# Patient Record
Sex: FEMALE | Race: WHITE | NOT HISPANIC OR LATINO | Employment: FULL TIME | URBAN - METROPOLITAN AREA
[De-identification: names, ages, dates, MRNs, and addresses within clinical notes are randomized per-mention and may not be internally consistent; named-entity substitution may affect disease eponyms.]

---

## 2018-08-21 ENCOUNTER — HOSPITAL ENCOUNTER (EMERGENCY)
Facility: CLINIC | Age: 28
Discharge: HOME | End: 2018-08-21
Attending: FAMILY MEDICINE | Admitting: FAMILY MEDICINE
Payer: COMMERCIAL

## 2018-08-21 ENCOUNTER — APPOINTMENT (OUTPATIENT)
Dept: CT IMAGING | Facility: CLINIC | Age: 28
End: 2018-08-21
Attending: FAMILY MEDICINE
Payer: COMMERCIAL

## 2018-08-21 VITALS
HEART RATE: 85 BPM | OXYGEN SATURATION: 99 % | RESPIRATION RATE: 16 BRPM | DIASTOLIC BLOOD PRESSURE: 79 MMHG | SYSTOLIC BLOOD PRESSURE: 135 MMHG | TEMPERATURE: 99.5 F | WEIGHT: 210 LBS

## 2018-08-21 DIAGNOSIS — R10.2 ABDOMINAL PAIN, SUPRAPUBIC: ICD-10-CM

## 2018-08-21 DIAGNOSIS — N83.201 CYST OF RIGHT OVARY: ICD-10-CM

## 2018-08-21 DIAGNOSIS — R01.1 UNDIAGNOSED CARDIAC MURMURS: ICD-10-CM

## 2018-08-21 LAB
ALBUMIN SERPL-MCNC: 3.8 G/DL (ref 3.4–5)
ALBUMIN UR-MCNC: NEGATIVE MG/DL
ALP SERPL-CCNC: 70 U/L (ref 40–150)
ALT SERPL W P-5'-P-CCNC: 22 U/L (ref 0–50)
ANION GAP SERPL CALCULATED.3IONS-SCNC: 5 MMOL/L (ref 3–14)
APPEARANCE UR: CLEAR
AST SERPL W P-5'-P-CCNC: 10 U/L (ref 0–45)
BASOPHILS # BLD AUTO: 0.1 10E9/L (ref 0–0.2)
BASOPHILS NFR BLD AUTO: 0.5 %
BILIRUB SERPL-MCNC: 0.2 MG/DL (ref 0.2–1.3)
BILIRUB UR QL STRIP: NEGATIVE
BUN SERPL-MCNC: 9 MG/DL (ref 7–30)
CALCIUM SERPL-MCNC: 8.9 MG/DL (ref 8.5–10.1)
CHLORIDE SERPL-SCNC: 109 MMOL/L (ref 94–109)
CO2 SERPL-SCNC: 27 MMOL/L (ref 20–32)
COLOR UR AUTO: YELLOW
CREAT SERPL-MCNC: 0.65 MG/DL (ref 0.52–1.04)
DIFFERENTIAL METHOD BLD: NORMAL
EOSINOPHIL # BLD AUTO: 0.1 10E9/L (ref 0–0.7)
EOSINOPHIL NFR BLD AUTO: 1 %
ERYTHROCYTE [DISTWIDTH] IN BLOOD BY AUTOMATED COUNT: 12.3 % (ref 10–15)
GFR SERPL CREATININE-BSD FRML MDRD: >90 ML/MIN/1.7M2
GLUCOSE SERPL-MCNC: 81 MG/DL (ref 70–99)
GLUCOSE UR STRIP-MCNC: NEGATIVE MG/DL
HCG SERPL QL: NEGATIVE
HCT VFR BLD AUTO: 38.7 % (ref 35–47)
HGB BLD-MCNC: 12.6 G/DL (ref 11.7–15.7)
HGB UR QL STRIP: NEGATIVE
IMM GRANULOCYTES # BLD: 0 10E9/L (ref 0–0.4)
IMM GRANULOCYTES NFR BLD: 0.4 %
KETONES UR STRIP-MCNC: NEGATIVE MG/DL
LEUKOCYTE ESTERASE UR QL STRIP: NEGATIVE
LIPASE SERPL-CCNC: 135 U/L (ref 73–393)
LYMPHOCYTES # BLD AUTO: 2 10E9/L (ref 0.8–5.3)
LYMPHOCYTES NFR BLD AUTO: 17.9 %
MCH RBC QN AUTO: 31.4 PG (ref 26.5–33)
MCHC RBC AUTO-ENTMCNC: 32.6 G/DL (ref 31.5–36.5)
MCV RBC AUTO: 97 FL (ref 78–100)
MONOCYTES # BLD AUTO: 0.6 10E9/L (ref 0–1.3)
MONOCYTES NFR BLD AUTO: 5.2 %
NEUTROPHILS # BLD AUTO: 8.2 10E9/L (ref 1.6–8.3)
NEUTROPHILS NFR BLD AUTO: 75 %
NITRATE UR QL: NEGATIVE
NRBC # BLD AUTO: 0 10*3/UL
NRBC BLD AUTO-RTO: 0 /100
PH UR STRIP: 8 PH (ref 5–7)
PLATELET # BLD AUTO: 283 10E9/L (ref 150–450)
POTASSIUM SERPL-SCNC: 3.8 MMOL/L (ref 3.4–5.3)
PROT SERPL-MCNC: 7.5 G/DL (ref 6.8–8.8)
RBC # BLD AUTO: 4.01 10E12/L (ref 3.8–5.2)
RBC #/AREA URNS AUTO: <1 /HPF (ref 0–2)
SODIUM SERPL-SCNC: 141 MMOL/L (ref 133–144)
SOURCE: ABNORMAL
SP GR UR STRIP: 1.01 (ref 1–1.03)
SQUAMOUS #/AREA URNS AUTO: <1 /HPF (ref 0–1)
UROBILINOGEN UR STRIP-MCNC: 0 MG/DL (ref 0–2)
WBC # BLD AUTO: 10.9 10E9/L (ref 4–11)
WBC #/AREA URNS AUTO: <1 /HPF (ref 0–5)

## 2018-08-21 PROCEDURE — 83690 ASSAY OF LIPASE: CPT | Performed by: FAMILY MEDICINE

## 2018-08-21 PROCEDURE — 99284 EMERGENCY DEPT VISIT MOD MDM: CPT | Mod: Z6 | Performed by: FAMILY MEDICINE

## 2018-08-21 PROCEDURE — 99285 EMERGENCY DEPT VISIT HI MDM: CPT | Mod: 25 | Performed by: FAMILY MEDICINE

## 2018-08-21 PROCEDURE — 87086 URINE CULTURE/COLONY COUNT: CPT | Performed by: FAMILY MEDICINE

## 2018-08-21 PROCEDURE — 96374 THER/PROPH/DIAG INJ IV PUSH: CPT | Mod: 59 | Performed by: FAMILY MEDICINE

## 2018-08-21 PROCEDURE — 96361 HYDRATE IV INFUSION ADD-ON: CPT | Performed by: FAMILY MEDICINE

## 2018-08-21 PROCEDURE — 25000125 ZZHC RX 250: Performed by: FAMILY MEDICINE

## 2018-08-21 PROCEDURE — 81001 URINALYSIS AUTO W/SCOPE: CPT | Performed by: FAMILY MEDICINE

## 2018-08-21 PROCEDURE — 25000128 H RX IP 250 OP 636: Performed by: FAMILY MEDICINE

## 2018-08-21 PROCEDURE — 85025 COMPLETE CBC W/AUTO DIFF WBC: CPT | Performed by: FAMILY MEDICINE

## 2018-08-21 PROCEDURE — 74177 CT ABD & PELVIS W/CONTRAST: CPT

## 2018-08-21 PROCEDURE — 87088 URINE BACTERIA CULTURE: CPT | Performed by: FAMILY MEDICINE

## 2018-08-21 PROCEDURE — 84703 CHORIONIC GONADOTROPIN ASSAY: CPT | Performed by: FAMILY MEDICINE

## 2018-08-21 PROCEDURE — 80053 COMPREHEN METABOLIC PANEL: CPT | Performed by: FAMILY MEDICINE

## 2018-08-21 RX ORDER — KETOROLAC TROMETHAMINE 15 MG/ML
15 INJECTION, SOLUTION INTRAMUSCULAR; INTRAVENOUS ONCE
Status: COMPLETED | OUTPATIENT
Start: 2018-08-21 | End: 2018-08-21

## 2018-08-21 RX ORDER — ACETAMINOPHEN 500 MG
1000 TABLET ORAL
COMMUNITY
End: 2021-05-13

## 2018-08-21 RX ORDER — SODIUM CHLORIDE 9 MG/ML
1000 INJECTION, SOLUTION INTRAVENOUS CONTINUOUS
Status: DISCONTINUED | OUTPATIENT
Start: 2018-08-21 | End: 2018-08-21 | Stop reason: HOSPADM

## 2018-08-21 RX ORDER — IOPAMIDOL 755 MG/ML
100 INJECTION, SOLUTION INTRAVASCULAR ONCE
Status: COMPLETED | OUTPATIENT
Start: 2018-08-21 | End: 2018-08-21

## 2018-08-21 RX ADMIN — SODIUM CHLORIDE 1000 ML: 9 INJECTION, SOLUTION INTRAVENOUS at 14:53

## 2018-08-21 RX ADMIN — KETOROLAC TROMETHAMINE 15 MG: 15 INJECTION, SOLUTION INTRAMUSCULAR; INTRAVENOUS at 14:54

## 2018-08-21 RX ADMIN — SODIUM CHLORIDE 66 ML: 9 INJECTION, SOLUTION INTRAVENOUS at 16:51

## 2018-08-21 RX ADMIN — IOPAMIDOL 100 ML: 755 INJECTION, SOLUTION INTRAVENOUS at 16:51

## 2018-08-21 ASSESSMENT — ENCOUNTER SYMPTOMS
BLOOD IN STOOL: 0
RHINORRHEA: 0
DYSURIA: 1
SHORTNESS OF BREATH: 0
FEVER: 0
DIFFICULTY URINATING: 0
SORE THROAT: 0
DIARRHEA: 0
FREQUENCY: 0
CONSTIPATION: 0
COUGH: 0
HEADACHES: 0
SINUS PRESSURE: 0
ABDOMINAL PAIN: 1

## 2018-08-21 NOTE — ED AVS SNAPSHOT
[facility] Emergency Department    [address] [address]    Phone:  [phone]    Fax:  [phone]                                       Tammy Zaragoza   MRN: [id number]    Department:  [facility] Emergency Department   Date of Visit:  8/21/2018           After Visit Summary Signature Page     I have received my discharge instructions, and my questions have been answered. I have discussed any challenges I see with this plan with the nurse or doctor.    ..........................................................................................................................................  Patient/Patient Representative Signature      ..........................................................................................................................................  Patient Representative Print Name and Relationship to Patient    ..................................................               ................................................  Date                                            Time    ..........................................................................................................................................  Reviewed by Signature/Title    ...................................................              ..............................................  Date                                                            Time

## 2018-08-21 NOTE — DISCHARGE INSTRUCTIONS
ICD-10-CM    1. Abdominal pain, suprapubic R10.2 Urine Culture    May be due to ovarian cyst. typically this responds to antiinflammatory medications such as ibuprofen 600 mg every 6 hours. await urine culture.  Stay hydrated.  return for fever, worsening.  follow-up clinic         Abdominal Pain    Abdominal pain is pain in the stomach or belly area. Everyone has this pain from time to time. In many cases it goes away on its own. But abdominal pain can sometimes be due to a serious problem, such as appendicitis. So it s important to know when to seek help.  Causes of abdominal pain  There are many possible causes of abdominal pain. Common causes in adults include:    Constipation, diarrhea, or gas    Stomach acid flowing back up into the esophagus (acid reflux or heartburn)    Severe acid reflux, called GERD (gastroesophageal reflux disease)    A sore in the lining of the stomach or small intestine (peptic ulcer)    Inflammation of the gallbladder, liver, or pancreas    Gallstones or kidney stones    Appendicitis     Intestinal blockage     An internal organ pushing through a muscle or other tissue (hernia)    Urinary tract infections    In women, menstrual cramps, fibroids, or endometriosis    Inflammation or infection of the intestines  Diagnosing the cause of abdominal pain  Your healthcare provider will do a physical exam help find the cause of your pain. If needed, tests will be ordered. Belly pain has many possible causes. So it can be hard to find the reason for your pain. Giving details about your pain can help. Tell your provider where and when you feel the pain, and what makes it better or worse. Also let your provider know if you have other symptoms such as:    Fever    Tiredness    Upset stomach (nausea)    Vomiting    Changes in bathroom habits  Treating abdominal pain  Some causes of pain need emergency medical treatment right away. These include appendicitis or a bowel blockage. Other problems can  be treated with rest, fluids, or medicines. Your healthcare provider can give you specific instructions for treatment or self-care based on what is causing your pain.  If you have vomiting or diarrhea, sip water or other clear fluids. When you are ready to eat solid foods again, start with small amounts of easy-to-digest, low-fat foods. These include apple sauce, toast, or crackers.   When to seek medical care  Call 911 or go to the hospital right away if you:    Can t pass stool and are vomiting    Are vomiting blood or have bloody diarrhea or black, tarry diarrhea    Have chest, neck, or shoulder pain    Feel like you might pass out    Have pain in your shoulder blades with nausea    Have sudden, severe belly pain    Have new, severe pain unlike any you have felt before    Have a belly that is rigid, hard, and tender to touch  Call your healthcare provider if you have:    Pain for more than 5 days    Bloating for more than 2 days    Diarrhea for more than 5 days    A fever of 100.4 F (38 C) or higher, or as directed by your healthcare provider    Pain that gets worse    Weight loss for no reason    Continued lack of appetite    Blood in your stool  How to prevent abdominal pain  Here are some tips to help prevent abdominal pain:    Eat smaller amounts of food at one time.    Avoid greasy, fried, or other high-fat foods.    Avoid foods that give you gas.    Exercise regularly.    Drink plenty of fluids.  To help prevent GERD symptoms:    Quit smoking.    Reduce alcohol and certain foods that increase stomach acid.    Avoid aspirin and over-the-counter pain and fever medicines (NSAIDS or nonsteroidal anti-inflammatory drugs), if possible    Lose extra weight.    Finish eating at least 2 hours before you go to bed or lie down.    Raise the head of your bed.  Date Last Reviewed: 7/1/2016 2000-2017 [employer]. [address] [place] [address], [place] [address]. All rights reserved. This information is not  intended as a substitute for professional medical care. Always follow your healthcare professional's instructions.

## 2018-08-21 NOTE — ED AVS SNAPSHOT
[facility] Emergency Department    [address] [address]    Phone:  [phone]    Fax:  [phone]                                       Tammy Zaragoza   MRN: [id number]    Department:  [facility] Emergency Department   Date of Visit:  8/21/2018           Patient Information     Date Of Birth          1990        Your diagnoses for this visit were:     Abdominal pain, suprapubic May be due to ovarian cyst. typically this responds to antiinflammatory medications such as ibuprofen 600 mg every 6 hours. await urine culture.  Stay hydrated.  return for fever, worsening.  follow-up clinic       You were seen by Antonio Rodriguez MD.      Follow-up Information     Follow up with No Ref-Primary, Physician In 3 days.        Follow up with [facility] Emergency Department.    Specialty:  EMERGENCY MEDICINE    Why:  As needed, If symptoms worsen    Contact information:    [address] [place] [id number] [phone]    Additional information:    The medical center is located at   [address] [place] [address]. (between [facility] and   [facility] in [place], [place] miles north   of [place]).        Discharge Instructions         ICD-10-CM    1. Abdominal pain, suprapubic R10.2 Urine Culture    May be due to ovarian cyst. typically this responds to antiinflammatory medications such as ibuprofen 600 mg every 6 hours. await urine culture.  Stay hydrated.  return for fever, worsening.  follow-up clinic         Abdominal Pain    Abdominal pain is pain in the stomach or belly area. Everyone has this pain from time to time. In many cases it goes away on its own. But abdominal pain can sometimes be due to a serious problem, such as appendicitis. So it s important to know when to seek help.  Causes of abdominal pain  There are many possible causes of abdominal pain. Common causes in adults include:    Constipation, diarrhea, or gas    Stomach acid flowing back up into the esophagus (acid  reflux or heartburn)    Severe acid reflux, called GERD (gastroesophageal reflux disease)    A sore in the lining of the stomach or small intestine (peptic ulcer)    Inflammation of the gallbladder, liver, or pancreas    Gallstones or kidney stones    Appendicitis     Intestinal blockage     An internal organ pushing through a muscle or other tissue (hernia)    Urinary tract infections    In women, menstrual cramps, fibroids, or endometriosis    Inflammation or infection of the intestines  Diagnosing the cause of abdominal pain  Your healthcare provider will do a physical exam help find the cause of your pain. If needed, tests will be ordered. Belly pain has many possible causes. So it can be hard to find the reason for your pain. Giving details about your pain can help. Tell your provider where and when you feel the pain, and what makes it better or worse. Also let your provider know if you have other symptoms such as:    Fever    Tiredness    Upset stomach (nausea)    Vomiting    Changes in bathroom habits  Treating abdominal pain  Some causes of pain need emergency medical treatment right away. These include appendicitis or a bowel blockage. Other problems can be treated with rest, fluids, or medicines. Your healthcare provider can give you specific instructions for treatment or self-care based on what is causing your pain.  If you have vomiting or diarrhea, sip water or other clear fluids. When you are ready to eat solid foods again, start with small amounts of easy-to-digest, low-fat foods. These include apple sauce, toast, or crackers.   When to seek medical care  Call 911 or go to the hospital right away if you:    Can t pass stool and are vomiting    Are vomiting blood or have bloody diarrhea or black, tarry diarrhea    Have chest, neck, or shoulder pain    Feel like you might pass out    Have pain in your shoulder blades with nausea    Have sudden, severe belly pain    Have new, severe pain unlike any you  have felt before    Have a belly that is rigid, hard, and tender to touch  Call your healthcare provider if you have:    Pain for more than 5 days    Bloating for more than 2 days    Diarrhea for more than 5 days    A fever of 100.4 F (38 C) or higher, or as directed by your healthcare provider    Pain that gets worse    Weight loss for no reason    Continued lack of appetite    Blood in your stool  How to prevent abdominal pain  Here are some tips to help prevent abdominal pain:    Eat smaller amounts of food at one time.    Avoid greasy, fried, or other high-fat foods.    Avoid foods that give you gas.    Exercise regularly.    Drink plenty of fluids.  To help prevent GERD symptoms:    Quit smoking.    Reduce alcohol and certain foods that increase stomach acid.    Avoid aspirin and over-the-counter pain and fever medicines (NSAIDS or nonsteroidal anti-inflammatory drugs), if possible    Lose extra weight.    Finish eating at least 2 hours before you go to bed or lie down.    Raise the head of your bed.  Date Last Reviewed: 7/1/2016 2000-2017 [employer]. [address] [place] [address]. All rights reserved. This information is not intended as a substitute for professional medical care. Always follow your healthcare professional's instructions.          24 Hour Appointment Hotline       To make an appointment at any [facility], call [phone] ([phone]). If you don't have a family doctor or clinic, we will help you find one. [facility] clinics are conveniently located to serve the needs of you and your family.             Review of your medicines      Our records show that you are taking the medicines listed below. If these are incorrect, please call your family doctor or clinic.        Dose / Directions Last dose taken    acetaminophen 500 MG tablet   Commonly known as:  TYLENOL   Dose:  1000 mg        Take 1,000 mg by mouth once as needed for mild pain   Refills:  0         etonogestrel 68 MG Impl   Commonly known as:  IMPLANON/NEXPLANON   Dose:  1 each        1 each by Subdermal route once   Refills:  0                Procedures and tests performed during your visit     CBC with platelets differential    CT Abdomen Pelvis w Contrast    Comprehensive metabolic panel    HCG qualitative Blood    Lipase    UA with Microscopic    Urine Culture      Orders Needing Specimen Collection     None      Pending Results     Date and Time Order Name Status Description    8/21/2018 1732 Urine Culture In process     8/21/2018 1542 CT Abdomen Pelvis w Contrast Preliminary             Pending Culture Results     Date and Time Order Name Status Description    8/21/2018 1732 Urine Culture In process             Pending Results Instructions     If you had any lab results that were not finalized at the time of your Discharge, you can call the ED Lab Result RN at [phone]. You will be contacted by this team for any positive Lab results or changes in treatment. The nurses are available 7 days a week from 10A to 6:30P.  You can leave a message 24 hours per day and they will return your call.        Test Results From Your Hospital Stay        8/21/2018  3:10 PM      Component Results     Component Value Ref Range & Units Status    Sodium 141 133 - 144 mmol/L Final    Potassium 3.8 3.4 - 5.3 mmol/L Final    Chloride 109 94 - 109 mmol/L Final    Carbon Dioxide 27 20 - 32 mmol/L Final    Anion Gap 5 3 - 14 mmol/L Final    Glucose 81 70 - 99 mg/dL Final    Urea Nitrogen 9 7 - 30 mg/dL Final    Creatinine 0.65 0.52 - 1.04 mg/dL Final    GFR Estimate >90 >60 mL/min/1.7m2 Final    Non  GFR Calc    GFR Estimate If Black >90 >60 mL/min/1.7m2 Final    African American GFR Calc    Calcium 8.9 8.5 - 10.1 mg/dL Final    Bilirubin Total 0.2 0.2 - 1.3 mg/dL Final    Albumin 3.8 3.4 - 5.0 g/dL Final    Protein Total 7.5 6.8 - 8.8 g/dL Final    Alkaline Phosphatase 70 40 - 150 U/L Final    ALT 22 0 - 50  U/L Final    AST 10 0 - 45 U/L Final         8/21/2018  2:56 PM      Component Results     Component Value Ref Range & Units Status    WBC 10.9 4.0 - 11.0 10e9/L Final    RBC Count 4.01 3.8 - 5.2 10e12/L Final    Hemoglobin 12.6 11.7 - 15.7 g/dL Final    Hematocrit 38.7 35.0 - 47.0 % Final    MCV 97 78 - 100 fl Final    MCH 31.4 26.5 - 33.0 pg Final    MCHC 32.6 31.5 - 36.5 g/dL Final    RDW 12.3 10.0 - 15.0 % Final    Platelet Count 283 150 - 450 10e9/L Final    Diff Method Automated Method  Final    % Neutrophils 75.0 % Final    % Lymphocytes 17.9 % Final    % Monocytes 5.2 % Final    % Eosinophils 1.0 % Final    % Basophils 0.5 % Final    % Immature Granulocytes 0.4 % Final    Nucleated RBCs 0 0 /100 Final    Absolute Neutrophil 8.2 1.6 - 8.3 10e9/L Final    Absolute Lymphocytes 2.0 0.8 - 5.3 10e9/L Final    Absolute Monocytes 0.6 0.0 - 1.3 10e9/L Final    Absolute Eosinophils 0.1 0.0 - 0.7 10e9/L Final    Absolute Basophils 0.1 0.0 - 0.2 10e9/L Final    Abs Immature Granulocytes 0.0 0 - 0.4 10e9/L Final    Absolute Nucleated RBC 0.0  Final         8/21/2018  3:09 PM      Component Results     Component Value Ref Range & Units Status    Lipase 135 73 - 393 U/L Final         8/21/2018  3:05 PM      Component Results     Component Value Ref Range & Units Status    HCG Qualitative Serum Negative NEG^Negative Final    This test is for screening purposes.  Results should be interpreted along with   the clinical picture.  Confirmation testing is available if warranted by   ordering [id number], HCG Quantitative Pregnancy.           8/21/2018  2:44 PM      Component Results     Component Value Ref Range & Units Status    Color Urine Yellow  Final    Appearance Urine Clear  Final    Glucose Urine Negative NEG^Negative mg/dL Final    Bilirubin Urine Negative NEG^Negative Final    Ketones Urine Negative NEG^Negative mg/dL Final    Specific Gravity Urine 1.011 1.003 - 1.035 Final    Blood Urine Negative NEG^Negative Final    pH  Urine 8.0 (H) 5.0 - 7.0 pH Final    Protein Albumin Urine Negative NEG^Negative mg/dL Final    Urobilinogen mg/dL 0.0 0.0 - 2.0 mg/dL Final    Nitrite Urine Negative NEG^Negative Final    Leukocyte Esterase Urine Negative NEG^Negative Final    Source Midstream Urine  Final    WBC Urine <1 0 - 5 /HPF Final    RBC Urine <1 0 - 2 /HPF Final    Squamous Epithelial /HPF Urine <1 0 - 1 /HPF Final         8/21/2018  5:19 PM      Narrative     CT ABDOMEN AND PELVIS WITH CONTRAST   8/21/2018 4:57 PM     HISTORY: Lower abdominal pain.    COMPARISON: None.    TECHNIQUE: Following the uneventful administration of 100 mL Isovue  370 intravenous contrast, helical sections were acquired from the top  of the diaphragm through the pubic symphysis. Coronal reconstructions  were generated. Radiation dose for this scan was reduced using  automated exposure control, adjustment of the mA and/or kV according  to the patient's size, or iterative reconstruction technique.    FINDINGS:     Abdomen: The liver, spleen, pancreas, adrenal glands and kidneys are  unremarkable. The gallbladder is present. No enlarged lymph nodes or  free fluid in the upper abdomen.    Scan through the lower chest is unremarkable.    Pelvis: The small and large bowel are normal in caliber. The appendix  is unremarkable. No bowel wall thickening, pneumatosis or free  intraperitoneal gas. The uterus is present. 4.5 x 3.1 cm right adnexal  cyst. A trace amount of free fluid in the pelvis. No enlarged lymph  nodes in the pelvis.        Impression     IMPRESSION:   1. 4 cm right adnexal cyst, most likely a functional ovarian cyst.  2. A trace amount of free fluid in the pelvis. This is nonspecific,  but could relate to rupture of the aforementioned ovarian cyst.  3. No other cause of acute pain identified in the abdomen or pelvis.  The appendix is unremarkable.         8/21/2018  5:37 PM                Thank you for choosing [facility]       Thank you for choosing  "[facility] for your care. Our goal is always to provide you with excellent care. Hearing back from our patients is one way we can continue to improve our services. Please take a few minutes to complete the written survey that you may receive in the mail after you visit with us. Thank you!        [employer] Information     [employer] lets you send messages to your doctor, view your test results, renew your prescriptions, schedule appointments and more. To sign up, go to [email or web address] . Click on \"Log in\" on the left side of the screen, which will take you to the Welcome page. Then click on \"Sign up Now\" on the right side of the page.     You will be asked to enter the access code listed below, as well as some personal information. Please follow the directions to create your username and password.     Your access code is: [email or web address]  Expires: 2018  5:59 PM     Your access code will  in 90 days. If you need help or a new code, please call your [facility] clinic or [phone].        Care EveryWhere ID     This is your Care EveryWhere ID. This could be used by other organizations to access your [facility] medical records  [id number]        Equal Access to Services     JOSE ENRIQUE MOBLEY : Glenn Michel, gerard melendrez, qajuju kaalleonidas chisholm, jared arguello. So [facility] [phone].    ATENCIÓN: Si habla español, tiene a thacker disposición servicios gratuitos de asistencia lingüística. Llame al [phone].    We comply with applicable federal civil rights laws and [place] laws. We do not discriminate on the basis of race, color, national origin, age, disability, sex, sexual orientation, or gender identity.            After Visit Summary       This is your record. Keep this with you and show to your community pharmacist(s) and doctor(s) at your next visit.                  "

## 2018-08-21 NOTE — ED PROVIDER NOTES
History     Chief Complaint   Patient presents with     Abdominal Pain     with painful sex just prior. onset 0200     HPI  Tammy Zaragoza is a 28 year old female who presents to the Emergency Department with concern for low abdominal pain. Pain began around 0200 and has been severe and constant since onset. onset after intercourse. Patient describes pain as a pressure sensation in her low abdomen - it is not unilateral.  Pain is non-radiating. She took tylenol at 0830 with mild relief of pain. Patient denies urinary symptoms - no dysuria, urgency, frequency or hematuria. no vaginal bleeding or discharge. Patient doubts pregnancy, as she has a nexplanon in place. No history of STDs. Patient notes she has a resolving cough, which has been ongoing for 3 weeks. No recent travel or sick contacts. No tobacco use. Drinks alcohol occasionally. No illicit drug use. Patient has no daily medications. She has a history of migraines which she treats with ibuprofen, caffeine and sees a chiropractor.     Problem List:    There are no active problems to display for this patient.     Past Medical History:    No past medical history on file.    Past Surgical History:    No past surgical history on file.    Family History:    No family history on file.    Social History:  Marital Status:   [2]  Social History   Substance Use Topics     Smoking status: Not on file     Smokeless tobacco: Not on file     Alcohol use Not on file        Medications:      No current outpatient prescriptions on file.    Review of Systems   Constitutional: Negative for fever.   HENT: Negative for ear pain, rhinorrhea, sinus pressure and sore throat.    Eyes: Negative for visual disturbance.   Respiratory: Negative for cough and shortness of breath.    Cardiovascular: Negative for chest pain and leg swelling.   Gastrointestinal: Positive for abdominal pain. Negative for blood in stool, constipation and diarrhea.   Genitourinary: Positive for dysuria  (discomfort at the end of emptying bladder). Negative for difficulty urinating and frequency.   Skin: Negative for rash.   Neurological: Negative for headaches.       Physical Exam   BP: 135/79  Pulse: 85  Temp: 99.5  F (37.5  C)  Resp: 20  SpO2: 98 %    Physical Exam   Constitutional: She appears distressed.   HENT:   Mouth/Throat: Oropharynx is clear and moist and mucous membranes are normal.   Eyes: Conjunctivae are normal.   Neck: Neck supple.   Cardiovascular: Normal rate and regular rhythm.  Exam reveals no gallop and no friction rub.    Murmur heard.   Systolic murmur is present with a grade of 3/6   Pulmonary/Chest: Effort normal and breath sounds normal. No stridor. She has no wheezes. She has no rhonchi. She has no rales.   Abdominal: Soft. There is generalized tenderness (especially lower quadrants, but also RUQ.). There is no rebound and no guarding.   Musculoskeletal: She exhibits no edema.   Neurological: She is alert. She exhibits normal muscle tone.   Skin: No rash noted. She is not diaphoretic.       ED Course     ED Course     Procedures               Critical Care time:  none               Results for orders placed or performed during the hospital encounter of 08/21/18 (from the past 24 hour(s))   UA with Microscopic   Result Value Ref Range    Color Urine Yellow     Appearance Urine Clear     Glucose Urine Negative NEG^Negative mg/dL    Bilirubin Urine Negative NEG^Negative    Ketones Urine Negative NEG^Negative mg/dL    Specific Gravity Urine 1.011 1.003 - 1.035    Blood Urine Negative NEG^Negative    pH Urine 8.0 (H) 5.0 - 7.0 pH    Protein Albumin Urine Negative NEG^Negative mg/dL    Urobilinogen mg/dL 0.0 0.0 - 2.0 mg/dL    Nitrite Urine Negative NEG^Negative    Leukocyte Esterase Urine Negative NEG^Negative    Source Midstream Urine     WBC Urine <1 0 - 5 /HPF    RBC Urine <1 0 - 2 /HPF    Squamous Epithelial /HPF Urine <1 0 - 1 /HPF   Urine Culture   Result Value Ref Range    Specimen  Description Midstream Urine     Special Requests Specimen received in preservative     Culture Micro PENDING    Comprehensive metabolic panel   Result Value Ref Range    Sodium 141 133 - 144 mmol/L    Potassium 3.8 3.4 - 5.3 mmol/L    Chloride 109 94 - 109 mmol/L    Carbon Dioxide 27 20 - 32 mmol/L    Anion Gap 5 3 - 14 mmol/L    Glucose 81 70 - 99 mg/dL    Urea Nitrogen 9 7 - 30 mg/dL    Creatinine 0.65 0.52 - 1.04 mg/dL    GFR Estimate >90 >60 mL/min/1.7m2    GFR Estimate If Black >90 >60 mL/min/1.7m2    Calcium 8.9 8.5 - 10.1 mg/dL    Bilirubin Total 0.2 0.2 - 1.3 mg/dL    Albumin 3.8 3.4 - 5.0 g/dL    Protein Total 7.5 6.8 - 8.8 g/dL    Alkaline Phosphatase 70 40 - 150 U/L    ALT 22 0 - 50 U/L    AST 10 0 - 45 U/L   CBC with platelets differential   Result Value Ref Range    WBC 10.9 4.0 - 11.0 10e9/L    RBC Count 4.01 3.8 - 5.2 10e12/L    Hemoglobin 12.6 11.7 - 15.7 g/dL    Hematocrit 38.7 35.0 - 47.0 %    MCV 97 78 - 100 fl    MCH 31.4 26.5 - 33.0 pg    MCHC 32.6 31.5 - 36.5 g/dL    RDW 12.3 10.0 - 15.0 %    Platelet Count 283 150 - 450 10e9/L    Diff Method Automated Method     % Neutrophils 75.0 %    % Lymphocytes 17.9 %    % Monocytes 5.2 %    % Eosinophils 1.0 %    % Basophils 0.5 %    % Immature Granulocytes 0.4 %    Nucleated RBCs 0 0 /100    Absolute Neutrophil 8.2 1.6 - 8.3 10e9/L    Absolute Lymphocytes 2.0 0.8 - 5.3 10e9/L    Absolute Monocytes 0.6 0.0 - 1.3 10e9/L    Absolute Eosinophils 0.1 0.0 - 0.7 10e9/L    Absolute Basophils 0.1 0.0 - 0.2 10e9/L    Abs Immature Granulocytes 0.0 0 - 0.4 10e9/L    Absolute Nucleated RBC 0.0    Lipase   Result Value Ref Range    Lipase 135 73 - 393 U/L   HCG qualitative Blood   Result Value Ref Range    HCG Qualitative Serum Negative NEG^Negative   CT Abdomen Pelvis w Contrast    Narrative    CT ABDOMEN AND PELVIS WITH CONTRAST   8/21/2018 4:57 PM     HISTORY: Lower abdominal pain.    COMPARISON: None.    TECHNIQUE: Following the uneventful administration of 100  mL  Isovue-370 intravenous contrast, helical sections were acquired from  the top of the diaphragm through the pubic symphysis. Coronal  reconstructions were generated. Radiation dose for this scan was  reduced using automated exposure control, adjustment of the mA and/or  kV according to the patient's size, or iterative reconstruction  technique.    FINDINGS:     Abdomen: The liver, spleen, pancreas, adrenal glands and kidneys are  unremarkable. The gallbladder is present. No enlarged lymph nodes or  free fluid in the upper abdomen.    Scan through the lower chest is unremarkable.    Pelvis: The small and large bowel are normal in caliber. The appendix  is unremarkable. No bowel wall thickening, pneumatosis or free  intraperitoneal gas. The uterus is present. 4.5 x 3.1 cm right adnexal  cyst. A trace amount of free fluid in the pelvis. No enlarged lymph  nodes in the pelvis.      Impression    IMPRESSION:   1. 4 cm right adnexal cyst, most likely a functional ovarian cyst.  2. A trace amount of free fluid in the pelvis. This is nonspecific,  but could relate to rupture of the aforementioned ovarian cyst.  3. No other cause of acute pain identified in the abdomen or pelvis.  The appendix is unremarkable.    OTIS BRAVO MD       Medications - No data to display    2:07 PM Patient assessed.     Assessments & Plan (with Medical Decision Making)     MDM: Tammy Zaragoza is a 28 year old female who presented with suprapubic and lower abdominal pain while on Nexplanon in approximately 5 days prior to her next menses.  She denied significant urinary tract symptoms and there were no vaginal symptoms.  She has no history of sexually transmitted infection and she is in a longstanding monogamous relationship.   Pregnancy test was negative.  She had no change in her bowel habits.  Her vital signs were normal with a temperature T-max of 99.5.  Her urinalysis is clear, her CBC normal her comprehensive panel normal.  The pain  she was experiencing crossed over to the right lower quadrant.  The differential diagnosis included appendicitis as well as ovarian cyst, urinary tract source, pelvic infection, CT of the abdomen and pelvis showed a 4 cm ovarian cyst.  We did discuss that it may need follow-up given we cannot absolutely tell this is a simple cyst but most likely simple at the age of 28.  CT is nearly as accurate as ultrasound for ovarian torsion given the bilateral nature of her pain doubt torsion.  She does have extensive stool in the right colon and we discussed this as possible etiology.  No other findings were identified.  Although her urine was completely normal she had to urinate multiple times  in the emergency department and they did send the urine for culture.  she does have a murmur 3 out of 6 systolic -  and she was not aware of murmur previously.  Would like her to get this rechecked.  This could simply be a flow murmur today.   I did attempt to arrange a follow-up for her in clinic on thursday.      I have reviewed the nursing notes.    I have reviewed the findings, diagnosis, plan and need for follow up with the patient.       New Prescriptions    No medications on file       Final diagnoses:   Abdominal pain, suprapubic - May be due to ovarian cyst. typically this responds to antiinflammatory medications such as ibuprofen 600 mg every 6 hours. await urine culture.  Stay hydrated.  return for fever, worsening.  follow-up clinic   Cyst of right ovary - consider repeat ultrasound in 3 months to confirm resolution   Undiagnosed cardiac murmurs - re-eval in clinic     This document serves as a record of the services and decisions personally performed and made by Antonio Rodriguez MD. It was created on his behalf by Marianne Velez, a trained medical scribe. The creation of this document is based the provider's statements to the medical scribe.  Marianne Velez 2:07 PM 8/21/2018    Provider:   The information in  this document, created by the medical scribe for me, accurately reflects the services I personally performed and the decisions made by me. I have reviewed and approved this document for accuracy prior to leaving the patient care area.  Antonio Rodriguez MD 2:07 PM 8/21/2018 8/21/2018   [facility] EMERGENCY DEPARTMENT     Antonio Rodriguez MD  08/21/18 [id number]

## 2018-08-22 ENCOUNTER — TELEPHONE (OUTPATIENT)
Dept: EMERGENCY MEDICINE | Facility: CLINIC | Age: 28
End: 2018-08-22

## 2018-08-22 NOTE — TELEPHONE ENCOUNTER
[facility] Lab result notification:    [facility] ED lab result protocol used  Miscellaneous     Reason for call  Notify of lab results, assess symptoms,  review ED providers recommendations/discharge instructions (if necessary) and advise per ED lab result f/u protocol    Information table from ED Provider visit on 8/21/18  Symptoms reported at ED visit (Chief complaint, HPI) Tammy Zaragoza is a 28 year old female who presents to the Emergency Department with concern for low abdominal pain. Pain began around 0200 and has been severe and constant since onset. onset after intercourse. Patient describes pain as a pressure sensation in her low abdomen - it is not unilateral.  Pain is non-radiating. She took tylenol at 0830 with mild relief of pain. Patient denies urinary symptoms - no dysuria, urgency, frequency or hematuria. no vaginal bleeding or discharge. Patient doubts pregnancy, as she has a nexplanon in place. No history of STDs. Patient notes she has a resolving cough, which has been ongoing for 3 weeks. No recent travel or sick contacts. No tobacco use. Drinks alcohol occasionally. No illicit drug use. Patient has no daily medications. She has a history of migraines which she treats with ibuprofen, caffeine and sees a chiropractor.    ED providers Impression and Plan (applicable information) MDM: Tammy Zaragoza is a 28 year old female who presented with suprapubic and lower abdominal pain while on Nexplanon in approximately 5 days prior to her next menses.  She denied significant urinary tract symptoms and there were no vaginal symptoms.  She has no history of sexually transmitted infection and she is in a longstanding monogamous relationship.   Pregnancy test was negative.  She had no change in her bowel habits.  Her vital signs were normal with a temperature T-max of 99.5.  Her urinalysis is clear, her CBC normal her comprehensive panel normal.  The pain she was experiencing  crossed over to the right lower quadrant.  The differential diagnosis included appendicitis as well as ovarian cyst, urinary tract source, pelvic infection, CT of the abdomen and pelvis showed a 4 cm ovarian cyst.  We did discuss that it may need follow-up given we cannot absolutely tell this is a simple cyst but most likely simple at the age of 28.  CT is nearly as accurate as ultrasound for ovarian torsion given the bilateral nature of her pain doubt torsion.  She does have extensive stool in the right colon and we discussed this as possible etiology.  No other findings were identified.  Although her urine was completely normal she had to urinate multiple times  in the emergency department and they did send the urine for culture.  she does have a murmur 3 out of 6 systolic -  and she was not aware of murmur previously.  Would like her to get this rechecked.  This could simply be a flow murmur today.   I did attempt to arrange a follow-up for her in clinic on thursday.      Miscellaneous information I saw Tammy in ED. On her discharge I forgot to tell her to recheck heart murmur in clinic.    May just have been flow murmur and can ignore if resolved on recheck.    Also forgot to tell her to let her follow-up provider know about ovarian cyst.  May need repeat ultrasound in 3 months.     Finally, I tried to set-up a follow-up for her on thursday.  However it was accidentally set up for friday 1000 and message left on her machine.  the patient told me she could not make that time. .  I asked [facility] to cancel that and let the patient know there was nothing available for thursday. I'm not sure if that was done.  Could you check follow-up with her also?      RN Assessment (Patient s current Symptoms), include time called.  [Insert Left message here if message left]  Advised of recommendations.  Does f/u with Dr. Langley on Friday.  Tammy verbalizes understanding.    Please Contact your PCP clinic or return to the Emergency  department if your:    Symptoms return.    Symptoms worsen or other concerning symptom's.    PCP follow-up Questions asked: YES       Chiqui Garcia, RN    [facility] RN  Lung Nodule and ED Lab Results F/U RN  Epic pool (ED late result f/u RN) : P [id number]   # [phone]

## 2018-08-23 PROBLEM — R82.71 GBS BACTERIURIA: Status: ACTIVE | Noted: 2018-08-23

## 2018-08-23 LAB
BACTERIA SPEC CULT: ABNORMAL
Lab: ABNORMAL
SPECIMEN SOURCE: ABNORMAL

## 2018-08-24 ENCOUNTER — TELEPHONE (OUTPATIENT)
Dept: FAMILY MEDICINE | Facility: CLINIC | Age: 28
End: 2018-08-24

## 2018-08-24 ENCOUNTER — OFFICE VISIT (OUTPATIENT)
Dept: FAMILY MEDICINE | Facility: CLINIC | Age: 28
End: 2018-08-24
Payer: COMMERCIAL

## 2018-08-24 VITALS
OXYGEN SATURATION: 98 % | HEIGHT: 63 IN | TEMPERATURE: 97.3 F | BODY MASS INDEX: 36.86 KG/M2 | WEIGHT: 208 LBS | SYSTOLIC BLOOD PRESSURE: 132 MMHG | DIASTOLIC BLOOD PRESSURE: 76 MMHG | HEART RATE: 91 BPM

## 2018-08-24 DIAGNOSIS — N83.209 RUPTURED OVARIAN CYST: Primary | ICD-10-CM

## 2018-08-24 DIAGNOSIS — R01.1 HEART MURMUR: ICD-10-CM

## 2018-08-24 PROCEDURE — 99203 OFFICE O/P NEW LOW 30 MIN: CPT | Performed by: INTERNAL MEDICINE

## 2018-08-24 NOTE — PROGRESS NOTES
"  SUBJECTIVE:   Tammy Zaragoza is a 28 year old female who presents to clinic today for the following health issues:    FYI: For more details on patient hx see [facility] : [facility]        ED/ Followup:    Facility:  [facility]  Date of visit: 8/21/18  Reason for visit: abdominal pain + cyst of right ovary + undiagnosed cardiac murmurs  Current Status: Feeling better       ABDOMINAL PAIN     Onset: Follow up    Description:   Character: none  Location: na  Radiation: None    Intensity: better    Progression of Symptoms:  na    Accompanying Signs & Symptoms:  Fever/Chills?: no   Gas/Bloating: YES- bloating  Nausea: no   Vomitting: no   Diarrhea?: no   Constipation:no   Dysuria or Hematuria: no    History:   Trauma: no   Previous similar pain: YES   Previous tests done: CT    Precipitating factors:   Does the pain change with:     Food: no      BM: no     Urination: no     Alleviating factors:  na    Therapies Tried and outcome: na    LMP:  not applicable     CT scan     IMPRESSION:   1. 4 cm right adnexal cyst, most likely a functional ovarian cyst.  2. A trace amount of free fluid in the pelvis. This is nonspecific,  but could relate to rupture of the aforementioned ovarian cyst.  3. No other cause of acute pain identified in the abdomen or pelvis.  The appendix is unremarkable.     ER note \"she does have a murmur 3 out of 6 systolic -  and she was not aware of murmur previously.  Would like her to get this rechecked. This could simply be a flow murmur today.   I did attempt to arrange a follow-up for her in clinic on thursday.\"    --no further pain since ER visit.    Current Outpatient Prescriptions   Medication Sig Dispense Refill     etonogestrel (IMPLANON/NEXPLANON) 68 MG IMPL 1 each by Subdermal route once       NAPROXEN PO Take 220 mg by mouth       acetaminophen (TYLENOL) 500 MG tablet Take 1,000 mg by mouth once as needed for mild pain         Reviewed and updated as needed this visit " "by clinical staff  Tobacco  Allergies  Meds  Problems  Med Hx  Surg Hx  Fam Hx  Soc Hx        Reviewed and updated as needed this visit by Provider  Tobacco  Allergies  Meds  Problems  Med Hx  Surg Hx  Fam Hx  Soc Hx          ROS:  Constitutional, HEENT, cardiovascular, pulmonary, GI, , musculoskeletal, neuro, skin, endocrine and psych systems are negative, except as otherwise noted.    OBJECTIVE:     /76 (BP Location: Right arm, Patient Position: Sitting, Cuff Size: Adult Large)  Pulse 91  Temp 97.3  F (36.3  C) (Tympanic)  Ht 5' 3.39\" (1.61 m)  Wt 208 lb (94.3 kg)  LMP 07/27/2018  SpO2 98%  Breastfeeding? No  BMI 36.4 kg/m2  Body mass index is 36.4 kg/(m^2).  GENERAL APPEARANCE: healthy, alert, no distress and over weight  HENT: MMM  NECK: no adenopathy, no asymmetry, masses, or scars and thyroid normal to palpation  RESP: lungs clear to auscultation - no rales, rhonchi or wheezes  CV: regular rates and rhythm, normal S1 S2, no S3 or S4 and no murmur, click or rub  ABDOMEN: soft, mild LLQ/RLQ pain without hepatosplenomegaly or masses and bowel sounds normal  SKIN: no suspicious lesions or rashes  PSYCH: mentation appears normal and affect normal/bright    Diagnostic Test Results:  Results for orders placed or performed during the hospital encounter of 08/21/18   CT Abdomen Pelvis w Contrast    Narrative    CT ABDOMEN AND PELVIS WITH CONTRAST   8/21/2018 4:57 PM     HISTORY: Lower abdominal pain.    COMPARISON: None.    TECHNIQUE: Following the uneventful administration of 100 mL  Isovue-370 intravenous contrast, helical sections were acquired from  the top of the diaphragm through the pubic symphysis. Coronal  reconstructions were generated. Radiation dose for this scan was  reduced using automated exposure control, adjustment of the mA and/or  kV according to the patient's size, or iterative reconstruction  technique.    FINDINGS:     Abdomen: The liver, spleen, pancreas, adrenal " glands and kidneys are  unremarkable. The gallbladder is present. No enlarged lymph nodes or  free fluid in the upper abdomen.    Scan through the lower chest is unremarkable.    Pelvis: The small and large bowel are normal in caliber. The appendix  is unremarkable. No bowel wall thickening, pneumatosis or free  intraperitoneal gas. The uterus is present. 4.5 x 3.1 cm right adnexal  cyst. A trace amount of free fluid in the pelvis. No enlarged lymph  nodes in the pelvis.      Impression    IMPRESSION:   1. 4 cm right adnexal cyst, most likely a functional ovarian cyst.  2. A trace amount of free fluid in the pelvis. This is nonspecific,  but could relate to rupture of the aforementioned ovarian cyst.  3. No other cause of acute pain identified in the abdomen or pelvis.  The appendix is unremarkable.    OTIS BRAVO MD   Comprehensive metabolic panel   Result Value Ref Range    Sodium 141 133 - 144 mmol/L    Potassium 3.8 3.4 - 5.3 mmol/L    Chloride 109 94 - 109 mmol/L    Carbon Dioxide 27 20 - 32 mmol/L    Anion Gap 5 3 - 14 mmol/L    Glucose 81 70 - 99 mg/dL    Urea Nitrogen 9 7 - 30 mg/dL    Creatinine 0.65 0.52 - 1.04 mg/dL    GFR Estimate >90 >60 mL/min/1.7m2    GFR Estimate If Black >90 >60 mL/min/1.7m2    Calcium 8.9 8.5 - 10.1 mg/dL    Bilirubin Total 0.2 0.2 - 1.3 mg/dL    Albumin 3.8 3.4 - 5.0 g/dL    Protein Total 7.5 6.8 - 8.8 g/dL    Alkaline Phosphatase 70 40 - 150 U/L    ALT 22 0 - 50 U/L    AST 10 0 - 45 U/L   CBC with platelets differential   Result Value Ref Range    WBC 10.9 4.0 - 11.0 10e9/L    RBC Count 4.01 3.8 - 5.2 10e12/L    Hemoglobin 12.6 11.7 - 15.7 g/dL    Hematocrit 38.7 35.0 - 47.0 %    MCV 97 78 - 100 fl    MCH 31.4 26.5 - 33.0 pg    MCHC 32.6 31.5 - 36.5 g/dL    RDW 12.3 10.0 - 15.0 %    Platelet Count 283 150 - 450 10e9/L    Diff Method Automated Method     % Neutrophils 75.0 %    % Lymphocytes 17.9 %    % Monocytes 5.2 %    % Eosinophils 1.0 %    % Basophils 0.5 %    % Immature  Granulocytes 0.4 %    Nucleated RBCs 0 0 /100    Absolute Neutrophil 8.2 1.6 - 8.3 10e9/L    Absolute Lymphocytes 2.0 0.8 - 5.3 10e9/L    Absolute Monocytes 0.6 0.0 - 1.3 10e9/L    Absolute Eosinophils 0.1 0.0 - 0.7 10e9/L    Absolute Basophils 0.1 0.0 - 0.2 10e9/L    Abs Immature Granulocytes 0.0 0 - 0.4 10e9/L    Absolute Nucleated RBC 0.0    Lipase   Result Value Ref Range    Lipase 135 73 - 393 U/L   HCG qualitative Blood   Result Value Ref Range    HCG Qualitative Serum Negative NEG^Negative   UA with Microscopic   Result Value Ref Range    Color Urine Yellow     Appearance Urine Clear     Glucose Urine Negative NEG^Negative mg/dL    Bilirubin Urine Negative NEG^Negative    Ketones Urine Negative NEG^Negative mg/dL    Specific Gravity Urine 1.011 1.003 - 1.035    Blood Urine Negative NEG^Negative    pH Urine 8.0 (H) 5.0 - 7.0 pH    Protein Albumin Urine Negative NEG^Negative mg/dL    Urobilinogen mg/dL 0.0 0.0 - 2.0 mg/dL    Nitrite Urine Negative NEG^Negative    Leukocyte Esterase Urine Negative NEG^Negative    Source Midstream Urine     WBC Urine <1 0 - 5 /HPF    RBC Urine <1 0 - 2 /HPF    Squamous Epithelial /HPF Urine <1 0 - 1 /HPF   Urine Culture   Result Value Ref Range    Specimen Description Midstream Urine     Special Requests Specimen received in preservative     Culture Micro (A)      <10,000 colonies/mL  Beta hemolytic Streptococcus group B  Susceptibility testing not routinely done on this organism from the genitourinary tract.   Our antibiogram indicates that Group B streptococci are susceptible to ampicillin,   penicillin, vancomycin and the cephalosporins. Susceptibility testing must be requested   within 5 days.      Culture Micro       10,000 to 50,000 colonies/mL  mixed urogenital juliette  Susceptibility testing not routinely done      Culture Micro       Group B Streptococcus may be significant in OB patients, however, it is part of the normal   urogenital juliette.         ASSESSMENT/PLAN:        ICD-10-CM    1. Ruptured ovarian cyst N83.209    2. Heart murmur R01.1      Still with mild abdominal pain, not expected due to ruptured cyst.  Except this to resolve over next 2-4 days. May consider ultrasound if pain persisting - bleeding cyst?    No murmur today- innocent flow murmur    Patient Instructions   1. Ok to continue aleve through weekend.  If still having pain by mid-next week, consider follow-up US      Katja Langley,   [facility]

## 2018-08-24 NOTE — PATIENT INSTRUCTIONS
1. Ok to continue aleve through weekend.  If still having pain by mid-next week, consider follow-up US

## 2018-08-24 NOTE — TELEPHONE ENCOUNTER
Please abstract the following data from this visit with this patient into the appropriate field in Epic:    Pap smear done on this date: 9/1/16 (approximately), by this group: [facility], results were See CareEverywhere.  Route to abstraction.  Katie Pollack CMA (AAMA)   (aka: Ina Pollack)

## 2018-08-24 NOTE — MR AVS SNAPSHOT
"              After Visit Summary   2018    Tammy Zaragoza    MRN: [id number]           Patient Information     Date Of Birth          1990        Visit Information        Provider Department      2018 10:20 AM Katja Langley, DO [facility]        Care Instructions    1. Ok to continue aleve through weekend.  If still having pain by mid-next week, consider follow-up US          Follow-ups after your visit        Who to contact     If you have questions or need follow up information about today's clinic visit or your schedule please contact [facility] directly at [phone].  Normal or non-critical lab and imaging results will be communicated to you by Ap[employer], letter or phone within 4 business days after the clinic has received the results. If you do not hear from us within 7 days, please contact the clinic through Aprivahart or phone. If you have a critical or abnormal lab result, we will notify you by phone as soon as possible.  Submit refill requests through [employer] or call your pharmacy and they will forward the refill request to us. Please allow 3 business days for your refill to be completed.          Additional Information About Your Visit        MyChart Information     [employer] lets you send messages to your doctor, view your test results, renew your prescriptions, schedule appointments and more. To sign up, go to [email or web address] . Click on \"Log in\" on the left side of the screen, which will take you to the Welcome page. Then click on \"Sign up Now\" on the right side of the page.     You will be asked to enter the access code listed below, as well as some personal information. Please follow the directions to create your username and password.     Your access code is: [email or web address]  Expires: 2018  5:59 PM     Your access code will  in 90 days. If you need help or a new code, please call your [facility] or [phone].        Care " "EveryWhere ID     This is your Care EveryWhere ID. This could be used by other organizations to access your [place] medical records  [id number]        Your Vitals Were     Pulse Temperature Height Last Period Pulse Oximetry Breastfeeding?    91 97.3  F (36.3  C) (Tympanic) 5' 3.39\" (1.61 m) 07/27/2018 98% No    BMI (Body Mass Index)                   36.4 kg/m2            Blood Pressure from Last 3 Encounters:   08/24/18 132/76   08/21/18 135/79    Weight from Last 3 Encounters:   08/24/18 208 lb (94.3 kg)   08/21/18 210 lb (95.3 kg)              Today, you had the following     No orders found for display       Primary Care Provider Office Phone # Fax #    Katja LangleyDO [phone] [address] [facility] [address]        Equal Access to Services     JOSE ENRIQUE MOBLEY : Hadii aad howard hadasho Soomaali, waaxda luqadaha, qaybta kaalmada adeegyada, waxay whitneyin haymarion carol reina . So [facility] [phone].    ATENCIÓN: Si habla español, tiene a thacker disposición servicios gratuitos de asistencia lingüística. Susie al [phone].    We comply with applicable federal civil rights laws and [place] laws. We do not discriminate on the basis of race, color, national origin, age, disability, sex, sexual orientation, or gender identity.            Thank you!     Thank you for choosing [facility]  for your care. Our goal is always to provide you with excellent care. Hearing back from our patients is one way we can continue to improve our services. Please take a few minutes to complete the written survey that you may receive in the mail after your visit with us. Thank you!             Your Updated Medication List - Protect others around you: Learn how to safely use, store and throw away your medicines at www.disposemymeds.org.          This list is accurate as of 8/24/18 10:51 AM.  Always use your most recent med list.                   Brand Name Dispense Instructions for use " Diagnosis    acetaminophen 500 MG tablet    TYLENOL     Take 1,000 mg by mouth once as needed for mild pain        etonogestrel 68 MG Impl    IMPLANON/NEXPLANON     1 each by Subdermal route once        NAPROXEN PO      Take 220 mg by mouth

## 2020-03-11 ENCOUNTER — HEALTH MAINTENANCE LETTER (OUTPATIENT)
Age: 30
End: 2020-03-11

## 2021-01-03 ENCOUNTER — HEALTH MAINTENANCE LETTER (OUTPATIENT)
Age: 31
End: 2021-01-03

## 2021-04-25 ENCOUNTER — HEALTH MAINTENANCE LETTER (OUTPATIENT)
Age: 31
End: 2021-04-25

## 2021-05-13 ENCOUNTER — OFFICE VISIT (OUTPATIENT)
Dept: OBGYN | Facility: CLINIC | Age: 31
End: 2021-05-13
Payer: COMMERCIAL

## 2021-05-13 VITALS
DIASTOLIC BLOOD PRESSURE: 78 MMHG | HEART RATE: 88 BPM | WEIGHT: 211 LBS | SYSTOLIC BLOOD PRESSURE: 124 MMHG | TEMPERATURE: 98.5 F | BODY MASS INDEX: 36.92 KG/M2

## 2021-05-13 DIAGNOSIS — N97.9 PRIMARY FEMALE INFERTILITY: Primary | ICD-10-CM

## 2021-05-13 LAB
CHOLEST SERPL-MCNC: 217 MG/DL
ESTRADIOL SERPL-MCNC: 36 PG/ML
FSH SERPL-ACNC: 5.2 IU/L
HBA1C MFR BLD: 5 % (ref 0–5.6)
HDLC SERPL-MCNC: 51 MG/DL
LDLC SERPL CALC-MCNC: 146 MG/DL
LH SERPL-ACNC: 5 IU/L
NONHDLC SERPL-MCNC: 166 MG/DL
PROLACTIN SERPL-MCNC: 9 UG/L (ref 3–27)
TRIGL SERPL-MCNC: 102 MG/DL
TSH SERPL DL<=0.005 MIU/L-ACNC: 1.51 MU/L (ref 0.4–4)

## 2021-05-13 PROCEDURE — G0145 SCR C/V CYTO,THINLAYER,RESCR: HCPCS | Performed by: OBSTETRICS & GYNECOLOGY

## 2021-05-13 PROCEDURE — 83036 HEMOGLOBIN GLYCOSYLATED A1C: CPT | Performed by: OBSTETRICS & GYNECOLOGY

## 2021-05-13 PROCEDURE — 82670 ASSAY OF TOTAL ESTRADIOL: CPT | Performed by: OBSTETRICS & GYNECOLOGY

## 2021-05-13 PROCEDURE — 84403 ASSAY OF TOTAL TESTOSTERONE: CPT | Performed by: OBSTETRICS & GYNECOLOGY

## 2021-05-13 PROCEDURE — 87624 HPV HI-RISK TYP POOLED RSLT: CPT | Performed by: OBSTETRICS & GYNECOLOGY

## 2021-05-13 PROCEDURE — 99000 SPECIMEN HANDLING OFFICE-LAB: CPT | Performed by: OBSTETRICS & GYNECOLOGY

## 2021-05-13 PROCEDURE — 83001 ASSAY OF GONADOTROPIN (FSH): CPT | Performed by: OBSTETRICS & GYNECOLOGY

## 2021-05-13 PROCEDURE — 82627 DEHYDROEPIANDROSTERONE: CPT | Performed by: OBSTETRICS & GYNECOLOGY

## 2021-05-13 PROCEDURE — 82306 VITAMIN D 25 HYDROXY: CPT | Performed by: OBSTETRICS & GYNECOLOGY

## 2021-05-13 PROCEDURE — 99203 OFFICE O/P NEW LOW 30 MIN: CPT | Performed by: OBSTETRICS & GYNECOLOGY

## 2021-05-13 PROCEDURE — 80061 LIPID PANEL: CPT | Performed by: OBSTETRICS & GYNECOLOGY

## 2021-05-13 PROCEDURE — 84270 ASSAY OF SEX HORMONE GLOBUL: CPT | Performed by: OBSTETRICS & GYNECOLOGY

## 2021-05-13 PROCEDURE — 86787 VARICELLA-ZOSTER ANTIBODY: CPT | Performed by: OBSTETRICS & GYNECOLOGY

## 2021-05-13 PROCEDURE — 83002 ASSAY OF GONADOTROPIN (LH): CPT | Performed by: OBSTETRICS & GYNECOLOGY

## 2021-05-13 PROCEDURE — 84146 ASSAY OF PROLACTIN: CPT | Performed by: OBSTETRICS & GYNECOLOGY

## 2021-05-13 PROCEDURE — 83520 IMMUNOASSAY QUANT NOS NONAB: CPT | Performed by: OBSTETRICS & GYNECOLOGY

## 2021-05-13 PROCEDURE — 36415 COLL VENOUS BLD VENIPUNCTURE: CPT | Performed by: OBSTETRICS & GYNECOLOGY

## 2021-05-13 PROCEDURE — 86762 RUBELLA ANTIBODY: CPT | Performed by: OBSTETRICS & GYNECOLOGY

## 2021-05-13 PROCEDURE — 84443 ASSAY THYROID STIM HORMONE: CPT | Performed by: OBSTETRICS & GYNECOLOGY

## 2021-05-13 RX ORDER — CHLORAL HYDRATE 500 MG
2 CAPSULE ORAL DAILY
COMMUNITY
End: 2023-01-03

## 2021-05-13 RX ORDER — CALCIUM CARBONATE/VITAMIN D3 500-10/5ML
LIQUID (ML) ORAL
COMMUNITY

## 2021-05-13 RX ORDER — CHOLECALCIFEROL (VITAMIN D3) 50 MCG
1 TABLET ORAL DAILY
Status: ON HOLD | COMMUNITY
End: 2022-11-24

## 2021-05-13 RX ORDER — PRENATAL VIT/IRON FUM/FOLIC AC 27MG-0.8MG
1 TABLET ORAL DAILY
COMMUNITY

## 2021-05-13 RX ORDER — LETROZOLE 2.5 MG/1
2.5 TABLET, FILM COATED ORAL DAILY
Qty: 5 TABLET | Refills: 12 | Status: SHIPPED | OUTPATIENT
Start: 2021-05-13 | End: 2022-03-30

## 2021-05-13 NOTE — NURSING NOTE
"Initial /78 (BP Location: Right arm, Patient Position: Chair, Cuff Size: Adult Large)   Pulse 88   Temp 98.5  F (36.9  C) (Tympanic)   Wt 95.7 kg (211 lb)   LMP 05/06/2021 (Exact Date)   Breastfeeding No   BMI 36.92 kg/m   Estimated body mass index is 36.92 kg/m  as calculated from the following:    Height as of 8/24/18: 1.61 m (5' 3.39\").    Weight as of this encounter: 95.7 kg (211 lb). .    Anabella Mtz MA    "

## 2021-05-13 NOTE — PROGRESS NOTES
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  Since Aug 2020, was on nuvaring, but stopped this. Periods are coming every 28 days. Has now having 3 months where cycles are 35-38 day range. Had the COVID vaccine around the same time, wonders if this is the case.   Was doing OPKs, never got a positive test.   Will get some cramping prior to menses   Hx of ovarian cyst rupture. Resolved on its own.     OB Hx:  Never.     Female infertility factors:  Ovulation   Menses: 5/6/2021, usually only bleeds for 3 days total. Heavy the first day.    8th grade. Acne or facial hair growth.     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.     Male infertility factors:   Name, age, birthday: Gregg Zaragoza, 7/[birth year]/1990   PMH: none   Smoking, alcohol, drug use: none   Prior children: no    No past medical history on file.   Past Surgical History:   Procedure Laterality Date     wisdom teeth        Past Surgical History:   Procedure Laterality Date     wisdom teeth         Current Outpatient Medications   Medication     fish oil-omega-3 fatty acids 1000 MG capsule     magnesium oxide 400 MG CAPS     Prenatal Vit-Fe Fumarate-FA (PRENATAL MULTIVITAMIN W/IRON) 27-0.8 MG tablet     vitamin D3 (CHOLECALCIFEROL) 50 mcg (2000 units) tablet     No current facility-administered medications for this visit.      Allergies   Allergen Reactions     Norco [Hydrocodone-Acetaminophen] Itching     And constipated     Tape [Adhesive Tape]      Chlorhexidine Rash     Rash on skin     Family History   Problem Relation Age of Onset     No Known Problems Mother      No Known Problems Father      No Known Problems Sister      Social History     Tobacco Use     Smoking  status: Never Smoker     Smokeless tobacco: Never Used   Substance Use Topics     Alcohol use: Yes     Drug use: No       ROS: A 10 pt ROS was completed and found to be negative unless mentioned in the HPI.     Objective:   VS: /78 (BP Location: Right arm, Patient Position: Chair, Cuff Size: Adult Large)   Pulse 88   Temp 98.5  F (36.9  C) (Tympanic)   Wt 95.7 kg (211 lb)   LMP 2021 (Exact Date)   Breastfeeding No   BMI 36.92 kg/m    Gen: Pleasant, talkative female in no apparent distress   Endocrine: Thyroid without enlargement or nodularity   Dermatology: no acne, hirsutism or facial/back/abdominal hair growth appreciated   GYN: normal external genitalia, vagina and cervix   Lymph: no appreciable cervical lymphadenopathy  Respiratory: breathing comfortably on room air   Cardiac: warm and well-perfused.   GI: Abd soft and non-tender  MSK: Grossly normal movement of all four extremities  Psych: mood and affect bright       Assessment/Plan:   My impression is that this is a 31 year old  primary infertility, suspected cause is anovulation.   Counseled the patient on the basic steps to conception and reproductive anatomy, normal rates of conception during one year (~85%) and suspected pathology for her infertility. Discussed menstrual tracking, use of ovulation predictor kits and timed intercourse (every other day starting the day of +OPK). Recommended a menstrual calendar or phone anna.     Plan for work-up with day #3 labs including estradiol, LH, FSH, TSH, prolactin,AMH, and Vit D, then day #21 progesterone. Given concern for PCOS, plan to obtain free and total testosterone, DHEAS, 17-OHP, HgbA1c and lipid panel. Will also obtain rubella and varicella (to assess for immunity and offer vaccination prior to pregnancy). Plan to obtain pelvic US today to assess for uterine/ovarian/tubal pathology, then HSG (pt to call clinic on Day#1 of next menses) to assess for tubal patency. Pt is not at risk for  PID with HSG, so will not pre-treat with doxycycline.     Will evaluate for possible male factor infertility with semen analysis. Her partner is a patient at [facility], so will ask his NP to order a SA.     Preconception counseling: counseled patient to start daily PNV, limit caffeine intake (<250mg), no EtOH or drug use during pregnancy. I reviewed her medications and found no potential teratogens. Recommended against lubricant use given effects on sperm quality. Discussed patients BMI, 211lb and recommended weight loss. Pt is a non-smoker.     I discussed options to aide with ovulation including weight loss, letrazole or clomid for ovulation induction. I discussed how these medications work and their risks of ovarian cyst stimulation and 10% risk of twinning.   I spent a total of 32 min with the patient and 5 min in documentation.   Will MyChart with results and make plan from there.      Stephanie De La Torre MD  OB/GYN  5/13/2021

## 2021-05-14 LAB
DEPRECATED CALCIDIOL+CALCIFEROL SERPL-MC: 33 UG/L (ref 20–75)
DHEA-S SERPL-MCNC: 269 UG/DL (ref 35–430)
RUBV IGG SERPL IA-ACNC: 38 IU/ML

## 2021-05-15 LAB
MIS SERPL-MCNC: 13.11 NG/ML (ref 0.18–11.71)
SHBG SERPL-SCNC: 41 NMOL/L (ref 30–135)
TESTOST FREE SERPL-MCNC: 0.43 NG/DL (ref 0.08–0.74)
TESTOST SERPL-MCNC: 28 NG/DL (ref 8–60)

## 2021-05-17 LAB
COPATH REPORT: NORMAL
PAP: NORMAL
VZV IGG SER QL IA: 1.2 AI (ref 0–0.8)

## 2021-05-19 LAB
FINAL DIAGNOSIS: NORMAL
HPV HR 12 DNA CVX QL NAA+PROBE: NEGATIVE
HPV16 DNA SPEC QL NAA+PROBE: NEGATIVE
HPV18 DNA SPEC QL NAA+PROBE: NEGATIVE
SPECIMEN DESCRIPTION: NORMAL
SPECIMEN SOURCE CVX/VAG CYTO: NORMAL

## 2021-05-20 ENCOUNTER — HOSPITAL ENCOUNTER (OUTPATIENT)
Dept: ULTRASOUND IMAGING | Facility: CLINIC | Age: 31
Discharge: HOME | End: 2021-05-20
Attending: OBSTETRICS & GYNECOLOGY | Admitting: OBSTETRICS & GYNECOLOGY
Payer: COMMERCIAL

## 2021-05-20 DIAGNOSIS — N97.9 PRIMARY FEMALE INFERTILITY: ICD-10-CM

## 2021-05-20 PROCEDURE — 76830 TRANSVAGINAL US NON-OB: CPT

## 2021-07-21 ENCOUNTER — VIRTUAL VISIT (OUTPATIENT)
Dept: OBGYN | Facility: CLINIC | Age: 31
End: 2021-07-21
Payer: COMMERCIAL

## 2021-07-21 DIAGNOSIS — N97.0 PRIMARY ANOVULATORY INFERTILITY: Primary | ICD-10-CM

## 2021-07-21 PROCEDURE — 99213 OFFICE O/P EST LOW 20 MIN: CPT | Mod: TEL | Performed by: OBSTETRICS & GYNECOLOGY

## 2021-07-21 NOTE — PROGRESS NOTES
Tammy is a 31 year old who is being evaluated via a billable telephone visit.      What phone number would you like to be contacted at? [phone]    How would you like to obtain your AVS? MyChart    Subjective   Tammy is a 31 year old who presents for the following health issues - F/U infertility.   Reviewed results of Tammy's blood work - normal.   Reports that she did get a positive OPK on cycle day #17, so feels very confident that she ovulated.   Reviewed Gregg's SA with low total motile and no normal forms. Repeat sample without mention of morphology.       Objective           Vitals:  No vitals were obtained today due to virtual visit.    A/P: 32 yo primary infertility, likely anovulation/PCOS and male factor infertility.   Plan for Urology referral for abnormal SA. Reviewed SA with  who reports too low of counts to assess for morphology.   Did discuss the that IVF may be needed and briefly reviewed the steps of this process. I did discuss a monitored cycle with letrazole/follicular US/b-hcg trigger injection and IUI, but will curbside doctor at [facility] to see if this is even a reasonable treatment.   Tammy still needs HSG.   Will follow up via [employer] once we have more info. Gregg prefers to see Urologist in [facility] system due to convenience of working overnights at [facility].     Phone call duration: 28 minutes    Stephanie De La Torre MD  OB/GYN

## 2021-10-10 ENCOUNTER — HEALTH MAINTENANCE LETTER (OUTPATIENT)
Age: 31
End: 2021-10-10

## 2022-03-30 ENCOUNTER — PRENATAL OFFICE VISIT (OUTPATIENT)
Dept: OBGYN | Facility: CLINIC | Age: 32
End: 2022-03-30

## 2022-03-30 ENCOUNTER — APPOINTMENT (OUTPATIENT)
Dept: OBGYN | Facility: CLINIC | Age: 32
End: 2022-03-30
Payer: COMMERCIAL

## 2022-03-30 DIAGNOSIS — Z34.00 PRENATAL CARE, FIRST PREGNANCY: ICD-10-CM

## 2022-03-30 PROCEDURE — 99207 PR NO CHARGE NURSE ONLY: CPT | Performed by: OBSTETRICS & GYNECOLOGY

## 2022-03-30 RX ORDER — ONDANSETRON 4 MG/1
4 TABLET, FILM COATED ORAL EVERY 6 HOURS PRN
Status: ON HOLD | COMMUNITY
End: 2022-11-24

## 2022-03-30 RX ORDER — ASPIRIN 81 MG/1
81 TABLET, CHEWABLE ORAL DAILY
COMMUNITY
End: 2022-06-22

## 2022-03-30 RX ORDER — ESTRADIOL 0.1 MG/D
1 FILM, EXTENDED RELEASE TRANSDERMAL EVERY OTHER DAY
COMMUNITY
End: 2022-04-25

## 2022-03-30 NOTE — PROGRESS NOTES
Lifestyle and nutrition teaching completed   Brianda Frost OB Intake Nurse    Patient supplied answers from flow sheet for:  Prenatal OB Questionnaire.

## 2022-04-14 PROBLEM — O09.819 PREGNANCY CONCEIVED THROUGH IN VITRO FERTILIZATION: Status: ACTIVE | Noted: 2022-03-02

## 2022-04-25 ENCOUNTER — PRENATAL OFFICE VISIT (OUTPATIENT)
Dept: OBGYN | Facility: CLINIC | Age: 32
End: 2022-04-25
Payer: COMMERCIAL

## 2022-04-25 VITALS
TEMPERATURE: 99 F | BODY MASS INDEX: 36.13 KG/M2 | SYSTOLIC BLOOD PRESSURE: 119 MMHG | HEART RATE: 107 BPM | HEIGHT: 63 IN | WEIGHT: 203.9 LBS | RESPIRATION RATE: 16 BRPM | DIASTOLIC BLOOD PRESSURE: 74 MMHG

## 2022-04-25 DIAGNOSIS — Z34.01 ENCOUNTER FOR PRENATAL CARE IN FIRST TRIMESTER OF FIRST PREGNANCY: Primary | ICD-10-CM

## 2022-04-25 LAB
ABO/RH(D): NORMAL
ANTIBODY SCREEN: NEGATIVE
ERYTHROCYTE [DISTWIDTH] IN BLOOD BY AUTOMATED COUNT: 11.9 % (ref 10–15)
HBA1C MFR BLD: 5 % (ref 0–5.6)
HCT VFR BLD AUTO: 39.3 % (ref 35–47)
HGB BLD-MCNC: 12.7 G/DL (ref 11.7–15.7)
MCH RBC QN AUTO: 31.1 PG (ref 26.5–33)
MCHC RBC AUTO-ENTMCNC: 32.3 G/DL (ref 31.5–36.5)
MCV RBC AUTO: 96 FL (ref 78–100)
PLATELET # BLD AUTO: 258 10E3/UL (ref 150–450)
RBC # BLD AUTO: 4.08 10E6/UL (ref 3.8–5.2)
SPECIMEN EXPIRATION DATE: NORMAL
WBC # BLD AUTO: 7.7 10E3/UL (ref 4–11)

## 2022-04-25 PROCEDURE — 85027 COMPLETE CBC AUTOMATED: CPT | Performed by: OBSTETRICS & GYNECOLOGY

## 2022-04-25 PROCEDURE — 83036 HEMOGLOBIN GLYCOSYLATED A1C: CPT | Performed by: OBSTETRICS & GYNECOLOGY

## 2022-04-25 PROCEDURE — 86850 RBC ANTIBODY SCREEN: CPT | Performed by: OBSTETRICS & GYNECOLOGY

## 2022-04-25 PROCEDURE — 86762 RUBELLA ANTIBODY: CPT | Performed by: OBSTETRICS & GYNECOLOGY

## 2022-04-25 PROCEDURE — 87389 HIV-1 AG W/HIV-1&-2 AB AG IA: CPT | Performed by: OBSTETRICS & GYNECOLOGY

## 2022-04-25 PROCEDURE — 99207 PR FIRST OB VISIT: CPT | Performed by: OBSTETRICS & GYNECOLOGY

## 2022-04-25 PROCEDURE — 87491 CHLMYD TRACH DNA AMP PROBE: CPT | Performed by: OBSTETRICS & GYNECOLOGY

## 2022-04-25 PROCEDURE — 87340 HEPATITIS B SURFACE AG IA: CPT | Performed by: OBSTETRICS & GYNECOLOGY

## 2022-04-25 PROCEDURE — 76817 TRANSVAGINAL US OBSTETRIC: CPT | Performed by: OBSTETRICS & GYNECOLOGY

## 2022-04-25 PROCEDURE — 36415 COLL VENOUS BLD VENIPUNCTURE: CPT | Performed by: OBSTETRICS & GYNECOLOGY

## 2022-04-25 PROCEDURE — 86803 HEPATITIS C AB TEST: CPT | Performed by: OBSTETRICS & GYNECOLOGY

## 2022-04-25 PROCEDURE — 86780 TREPONEMA PALLIDUM: CPT | Performed by: OBSTETRICS & GYNECOLOGY

## 2022-04-25 PROCEDURE — 86900 BLOOD TYPING SEROLOGIC ABO: CPT | Performed by: OBSTETRICS & GYNECOLOGY

## 2022-04-25 PROCEDURE — 86901 BLOOD TYPING SEROLOGIC RH(D): CPT | Performed by: OBSTETRICS & GYNECOLOGY

## 2022-04-25 PROCEDURE — 87591 N.GONORRHOEAE DNA AMP PROB: CPT | Performed by: OBSTETRICS & GYNECOLOGY

## 2022-04-25 NOTE — PROGRESS NOTES
[facility]   OB/GYN Clinic    CC: New OB     Subjective:    Tammy is a 32 year old  at 10w3d by ETD, stated COLETTE for new OB visit. Denies any uterine cramping, abdominal pain or vaginal bleeding. Feeling queasy. No vomiting.       OB History    Para Term  AB Living   1 0 0 0 0 0   SAB IAB Ectopic Multiple Live Births   0 0 0 0 0      # Outcome Date GA Lbr Jagjit/2nd Weight Sex Delivery Anes PTL Lv   1 Current                Past Medical History:   Diagnosis Date     Chickenpox      Osteoarthritis     right foot       Past Surgical History:   Procedure Laterality Date     wisdom teeth         Current Outpatient Medications   Medication Sig Dispense Refill     aspirin (ASA) 81 MG chewable tablet Take 81 mg by mouth daily       estradiol (VIVELLE-DOT) 0.1 MG/24HR bi-weekly patch Place 1 patch onto the skin every other day       fish oil-omega-3 fatty acids 1000 MG capsule Take 2 g by mouth daily       magnesium oxide 400 MG CAPS        ondansetron (ZOFRAN) 4 MG tablet Take 4 mg by mouth every 6 hours as needed for nausea       Prenatal Vit-Fe Fumarate-FA (PRENATAL MULTIVITAMIN W/IRON) 27-0.8 MG tablet Take 1 tablet by mouth daily       progesterone (ENDOMETRIN) 100 MG vaginal insert Place 100 mg vaginally 3 times daily       vitamin D3 (CHOLECALCIFEROL) 50 mcg (2000 units) tablet Take 1 tablet by mouth daily (Patient not taking: Reported on 3/30/2022)         Family History   Problem Relation Age of Onset     No Known Problems Mother      No Known Problems Father      No Known Problems Sister      Colon Cancer Maternal Grandfather      Breast Cancer Paternal Grandmother      Coronary Artery Disease Paternal Grandfather         MI       Social History     Tobacco Use     Smoking status: Never Smoker     Smokeless tobacco: Never Used   Substance Use Topics     Alcohol use: Not Currently     Comment: occas-quit with pregnancy       ROS: A 10 pt ROS was completed and found to be negative  "unless mentioned in the HPI.     Objective:  /74 (BP Location: Right arm, Patient Position: Chair, Cuff Size: Adult Large)   Pulse 107   Temp 99  F (37.2  C) (Tympanic)   Resp 16   Ht 1.588 m (5' 2.5\")   Wt 92.5 kg (203 lb 14.4 oz)   LMP 2021   BMI 36.70 kg/m      Estimated body mass index is 36.7 kg/m  as calculated from the following:    Height as of this encounter: 1.588 m (5' 2.5\").    Weight as of this encounter: 92.5 kg (203 lb 14.4 oz).    Physical Exam:  Gen: Pleasant, talkative female in no apparent distress   Respiratory: Lungs clear, breathing comfortably on room air   Cardiac: Regular rate and rhythm with no murmurs, gallops or rubs. Warm and well-perfused.   GI: Abd soft and non-tender  : External genitalia is free of lesion. Urethra and bartholin glands normal.  Vaginal mucosa is moist and pink without unusual discharge. Uterus is consistent with a 10 week gestation.   MSK: Grossly normal movement of all four extremities  Psych: mood and affect bright   Lower extremity: edema not present     Transvaginal US: caal IUP measuring 10w1d, +cardiac activity at 170 bpm, normal adnexa      Assessment/Plan:   32 year old  at 10w3d by ETD c/w 10w1d US who presents for her initial OB visit.   1) Plan to draw new OB lab today (T&S, CBC, HIV, RPR, HepBsAg, Hep B antibody, rubella, GC/Chlam, UC)  2) Discussed routine prenatal care, group practice model at [facility], tertiary support and frequency of visits. Options for  testing for chromosomal and birth defects were discussed with the patient including nuchal translucency/blood marker testing in the first trimester, progenity and quad screening. She declines NIPT.   3) IVF pregnancy; discussed L2, Fetal ECHO, BPPs  4) Weight gain: discussed weight gain expectations (BMI >30: 11-20lbs) - hgba1c today   5) Immunizations: flu shot s/p, Tdap at 28wks, COVID s/p with booster, discussed risk of COVID in pregnancy including a " doubled risk of needing ICU levels care, intubation or death. Recommended social distancing, mask-wearing and avoiding unnecessary contacts. I also recommended the COVID in pregnancy per CDCs recommendations.      Return to clinic in 4 weeks.     Stephanie De La Torre MD  OB/GYN  4/25/2022

## 2022-04-25 NOTE — PROGRESS NOTES
"Initial /74 (BP Location: Right arm, Patient Position: Chair, Cuff Size: Adult Large)   Pulse 107   Temp 99  F (37.2  C) (Tympanic)   Resp 16   Ht 1.588 m (5' 2.5\")   Wt 92.5 kg (203 lb 14.4 oz)   LMP 07/09/2021   BMI 36.70 kg/m   Estimated body mass index is 36.7 kg/m  as calculated from the following:    Height as of this encounter: 1.588 m (5' 2.5\").    Weight as of this encounter: 92.5 kg (203 lb 14.4 oz). .      "

## 2022-04-26 LAB
C TRACH DNA SPEC QL PROBE+SIG AMP: NEGATIVE
HBV SURFACE AG SERPL QL IA: NONREACTIVE
HCV AB SERPL QL IA: NONREACTIVE
HIV 1+2 AB+HIV1 P24 AG SERPL QL IA: NONREACTIVE
N GONORRHOEA DNA SPEC QL NAA+PROBE: NEGATIVE
RUBV IGG SERPL QL IA: 4.7 INDEX
RUBV IGG SERPL QL IA: POSITIVE
T PALLIDUM AB SER QL: NONREACTIVE

## 2022-05-21 ENCOUNTER — HEALTH MAINTENANCE LETTER (OUTPATIENT)
Age: 32
End: 2022-05-21

## 2022-05-25 ENCOUNTER — PRENATAL OFFICE VISIT (OUTPATIENT)
Dept: OBGYN | Facility: CLINIC | Age: 32
End: 2022-05-25
Payer: COMMERCIAL

## 2022-05-25 VITALS
RESPIRATION RATE: 16 BRPM | DIASTOLIC BLOOD PRESSURE: 64 MMHG | TEMPERATURE: 98.3 F | HEIGHT: 63 IN | HEART RATE: 73 BPM | BODY MASS INDEX: 35.79 KG/M2 | SYSTOLIC BLOOD PRESSURE: 118 MMHG | WEIGHT: 202 LBS

## 2022-05-25 DIAGNOSIS — Z34.02 ENCOUNTER FOR PRENATAL CARE IN SECOND TRIMESTER OF FIRST PREGNANCY: Primary | ICD-10-CM

## 2022-05-25 PROCEDURE — 99207 PR PRENATAL VISIT: CPT | Performed by: OBSTETRICS & GYNECOLOGY

## 2022-05-25 NOTE — NURSING NOTE
"Initial /64 (BP Location: Right arm, Patient Position: Chair, Cuff Size: Adult Regular)   Pulse 73   Temp 98.3  F (36.8  C) (Tympanic)   Resp 16   Ht 1.588 m (5' 2.5\")   Wt 91.6 kg (202 lb)   LMP 07/09/2021   BMI 36.36 kg/m   Estimated body mass index is 36.36 kg/m  as calculated from the following:    Height as of this encounter: 1.588 m (5' 2.5\").    Weight as of this encounter: 91.6 kg (202 lb). .      "

## 2022-05-25 NOTE — PROGRESS NOTES
"[facility]   OB/GYN Clinic     CC: F/U OB      Subjective:     Tammy is a 32 year old  at 14w5d by ETD, stated COLETTE for new OB visit. Denies any uterine cramping, abdominal pain or vaginal bleeding. Feeling queasy. No vomiting.       ROS: A 10 pt ROS was completed and found to be negative unless mentioned in the HPI.      Objective:  /64 (BP Location: Right arm, Patient Position: Chair, Cuff Size: Adult Regular)   Pulse 73   Temp 98.3  F (36.8  C) (Tympanic)   Resp 16   Ht 1.588 m (5' 2.5\")   Wt 91.6 kg (202 lb)   LMP 2021   BMI 36.36 kg/m       Physical Exam:  Gen: Pleasant, talkative female in no apparent distress   Respiratory: Lungs clear, breathing comfortably on room air   Cardiac: Regular rate and rhythm with no murmurs, gallops or rubs. Warm and well-perfused.   GI: Abd soft and non-tender  MSK: Grossly normal movement of all four extremities  Psych: mood and affect bright   Lower extremity: edema not present      See OB flowsheet      Assessment/Plan:   32 year old  at 14w5d by ETD c/w 10w1d US who presents for F/U OB visit.   1) Plan to draw new OB labs - normal   2) Declines NIPT.   3) IVF pregnancy; discussed L2 - declined and would prefer just a regular L1, Fetal ECHO, BPPs  4) Weight gain: discussed weight gain expectations (BMI >30: 11-20lbs) - hgba1c nl   5) Immunizations: flu shot s/p, Tdap at 28wks, COVID s/p with booster    Return to clinic in 4 weeks. Bleeding precautions reviewed.      Stephanie De La Torre MD  OB/GYN  2022    "

## 2022-06-22 ENCOUNTER — PRENATAL OFFICE VISIT (OUTPATIENT)
Dept: OBGYN | Facility: CLINIC | Age: 32
End: 2022-06-22
Payer: COMMERCIAL

## 2022-06-22 VITALS
BODY MASS INDEX: 36.5 KG/M2 | DIASTOLIC BLOOD PRESSURE: 74 MMHG | HEART RATE: 81 BPM | WEIGHT: 206 LBS | SYSTOLIC BLOOD PRESSURE: 114 MMHG | TEMPERATURE: 97.3 F | RESPIRATION RATE: 16 BRPM | HEIGHT: 63 IN

## 2022-06-22 DIAGNOSIS — G43.809 OTHER MIGRAINE WITHOUT STATUS MIGRAINOSUS, NOT INTRACTABLE: Primary | ICD-10-CM

## 2022-06-22 PROCEDURE — 99207 PR PRENATAL VISIT: CPT | Performed by: OBSTETRICS & GYNECOLOGY

## 2022-06-22 RX ORDER — BUTALBITAL, ACETAMINOPHEN AND CAFFEINE 50; 325; 40 MG/1; MG/1; MG/1
1 TABLET ORAL EVERY 4 HOURS PRN
Qty: 20 EACH | Refills: 1 | Status: ON HOLD | OUTPATIENT
Start: 2022-06-22 | End: 2022-11-24

## 2022-06-22 NOTE — PROGRESS NOTES
"[facility]   OB/GYN Clinic     CC: F/U OB      Subjective:     Tammy is a 32 year old  at 18w5d by ETD, stated COLETTE for F/U OB visit. Denies any uterine cramping, abdominal pain or vaginal bleeding. Some flutters.         Objective:  /74 (BP Location: Right arm, Patient Position: Chair, Cuff Size: Adult Regular)   Pulse 81   Temp 97.3  F (36.3  C) (Tympanic)   Resp 16   Ht 1.588 m (5' 2.5\")   Wt 93.4 kg (206 lb)   LMP 2021   BMI 37.08 kg/m       Physical Exam:  Gen: Pleasant, talkative female in no apparent distress   Respiratory: breathing comfortably on room air   Cardiac: Regular rate, warm and well-perfused.   GI: Abd soft and non-tender  MSK: Grossly normal movement of all four extremities  Psych: mood and affect bright   Lower extremity: edema not present      See OB flowsheet      Assessment/Plan:   32 year old  at 18w5d by ETD c/w 10w1d US who presents for F/U OB visit.   1) Plan to draw new OB labs - normal   2) Declines NIPT.   3) IVF pregnancy; discussed L2 - declined and would prefer just a regular L1, Fetal ECHO, BPPs  4) Weight gain: discussed weight gain expectations (BMI >30: 11-20lbs) - hgba1c nl   5) Immunizations: flu shot s/p, Tdap at 28wks, COVID s/p with booster     Return to clinic in 4 weeks. Bleeding precautions reviewed.      Stephanie De La Torre MD  OB/GYN  2022  "

## 2022-06-22 NOTE — PROGRESS NOTES
"Initial /74 (BP Location: Right arm, Patient Position: Chair, Cuff Size: Adult Regular)   Pulse 81   Temp 97.3  F (36.3  C) (Tympanic)   Resp 16   Ht 1.588 m (5' 2.5\")   Wt 93.4 kg (206 lb)   LMP 07/09/2021   BMI 37.08 kg/m   Estimated body mass index is 37.08 kg/m  as calculated from the following:    Height as of this encounter: 1.588 m (5' 2.5\").    Weight as of this encounter: 93.4 kg (206 lb). .      "

## 2022-07-05 ENCOUNTER — HOSPITAL ENCOUNTER (OUTPATIENT)
Dept: ULTRASOUND IMAGING | Facility: CLINIC | Age: 32
Discharge: HOME | End: 2022-07-05
Attending: OBSTETRICS & GYNECOLOGY | Admitting: OBSTETRICS & GYNECOLOGY
Payer: COMMERCIAL

## 2022-07-05 DIAGNOSIS — O09.812 PREGNANCY RESULTING FROM IN VITRO FERTILIZATION IN SECOND TRIMESTER: ICD-10-CM

## 2022-07-05 DIAGNOSIS — Z34.02 ENCOUNTER FOR PRENATAL CARE IN SECOND TRIMESTER OF FIRST PREGNANCY: Primary | ICD-10-CM

## 2022-07-05 PROCEDURE — 76805 OB US >/= 14 WKS SNGL FETUS: CPT

## 2022-07-05 NOTE — RESULT ENCOUNTER NOTE
Pt notified of below.  Pt reports understanding.  Pt does not have further questions or concerns.  Patient prefers to have appointment here in radiology and forego the level 2.  Order placed for follow up.    Tasneem Cardona   Ob/Gyn Clinic  RN

## 2022-07-20 ENCOUNTER — PRENATAL OFFICE VISIT (OUTPATIENT)
Dept: OBGYN | Facility: CLINIC | Age: 32
End: 2022-07-20
Payer: COMMERCIAL

## 2022-07-20 VITALS
WEIGHT: 211.6 LBS | HEART RATE: 86 BPM | TEMPERATURE: 97.9 F | RESPIRATION RATE: 16 BRPM | SYSTOLIC BLOOD PRESSURE: 116 MMHG | DIASTOLIC BLOOD PRESSURE: 71 MMHG | BODY MASS INDEX: 37.49 KG/M2 | HEIGHT: 63 IN

## 2022-07-20 DIAGNOSIS — Z34.02 ENCOUNTER FOR SUPERVISION OF NORMAL FIRST PREGNANCY IN SECOND TRIMESTER: Primary | ICD-10-CM

## 2022-07-20 PROCEDURE — 99207 PR PRENATAL VISIT: CPT | Performed by: OBSTETRICS & GYNECOLOGY

## 2022-07-20 NOTE — NURSING NOTE
"Initial /71 (BP Location: Right arm, Patient Position: Chair, Cuff Size: Adult Large)   Pulse 86   Temp 97.9  F (36.6  C) (Tympanic)   Resp 16   Ht 1.588 m (5' 2.5\")   Wt 96 kg (211 lb 9.6 oz)   LMP 07/09/2021   Breastfeeding No   BMI 38.09 kg/m   Estimated body mass index is 38.09 kg/m  as calculated from the following:    Height as of this encounter: 1.588 m (5' 2.5\").    Weight as of this encounter: 96 kg (211 lb 9.6 oz). .    Aye Damian, MARIE    "

## 2022-07-20 NOTE — PROGRESS NOTES
Concerns today: incomplete anatomic screen  No nausea/vomiting. No heartburn.  No vaginal bleeding, no contractions/severe cramping, no leakage of fluid.  No vaginal discharge. No dysuria  No headache, vision changes, lower extremity swelling, upper abdominal pain, chest pain, shortness of breath.   Discussed the option of using daily BS testing on a regular diet to establish whether she is diabetic.  (Z34.02) Encounter for supervision of normal first pregnancy in second trimester  (primary encounter diagnosis)  Comment: migraines with glucose intake  Plan: CBC with platelets, Treponema Abs w Reflex to         RPR and Titer, US OB >14 Weeks Follow Up, AMB         Adult Diabetes Educator Referral, CANCELED:         Glucose tolerance gest screen 1 hour            Darrell Montesinos MD

## 2022-07-22 ENCOUNTER — TELEPHONE (OUTPATIENT)
Dept: OBGYN | Facility: CLINIC | Age: 32
End: 2022-07-22

## 2022-07-22 DIAGNOSIS — Z34.02 ENCOUNTER FOR PRENATAL CARE IN SECOND TRIMESTER OF FIRST PREGNANCY: Primary | ICD-10-CM

## 2022-07-22 NOTE — TELEPHONE ENCOUNTER
Diabetes Education Scheduling Outreach #1:    Call to patient to schedule. Left message with phone number to call to schedule.    Plan for 2nd outreach attempt within 1 business day.    Phylicia Jung OnCall  Diabetes and Nutrition Scheduling

## 2022-07-27 NOTE — TELEPHONE ENCOUNTER
Orders placed for glucometer and testing supplies per Dr. Montesinos / consult with Diabetic Education.    Tasneem Cardona   Ob/Gyn Clinic  RN

## 2022-07-27 NOTE — TELEPHONE ENCOUNTER
Diabetes Education Scheduling Outreach #2:    Call to patient to schedule. Patient declined to schedule says appt not needed just a rx for a glucometer. Please call pt to advise.        Torres Shukla  [phone]   Diabetes and Nutrition Scheduling

## 2022-08-17 ENCOUNTER — PRENATAL OFFICE VISIT (OUTPATIENT)
Dept: OBGYN | Facility: CLINIC | Age: 32
End: 2022-08-17
Payer: COMMERCIAL

## 2022-08-17 VITALS
RESPIRATION RATE: 18 BRPM | SYSTOLIC BLOOD PRESSURE: 131 MMHG | WEIGHT: 217.1 LBS | BODY MASS INDEX: 38.46 KG/M2 | TEMPERATURE: 97.8 F | DIASTOLIC BLOOD PRESSURE: 72 MMHG | HEIGHT: 63 IN | HEART RATE: 91 BPM

## 2022-08-17 DIAGNOSIS — Z34.01 ENCOUNTER FOR PRENATAL CARE IN FIRST TRIMESTER OF FIRST PREGNANCY: ICD-10-CM

## 2022-08-17 DIAGNOSIS — Z34.02 ENCOUNTER FOR SUPERVISION OF NORMAL FIRST PREGNANCY IN SECOND TRIMESTER: Primary | ICD-10-CM

## 2022-08-17 LAB
ALBUMIN UR-MCNC: NEGATIVE MG/DL
APPEARANCE UR: CLEAR
BACTERIA #/AREA URNS HPF: ABNORMAL /HPF
BILIRUB UR QL STRIP: NEGATIVE
COLOR UR AUTO: YELLOW
ERYTHROCYTE [DISTWIDTH] IN BLOOD BY AUTOMATED COUNT: 13 % (ref 10–15)
GLUCOSE UR STRIP-MCNC: NEGATIVE MG/DL
HCT VFR BLD AUTO: 36.2 % (ref 35–47)
HGB BLD-MCNC: 11.6 G/DL (ref 11.7–15.7)
HGB UR QL STRIP: NEGATIVE
KETONES UR STRIP-MCNC: NEGATIVE MG/DL
LEUKOCYTE ESTERASE UR QL STRIP: NEGATIVE
MCH RBC QN AUTO: 30.9 PG (ref 26.5–33)
MCHC RBC AUTO-ENTMCNC: 32 G/DL (ref 31.5–36.5)
MCV RBC AUTO: 96 FL (ref 78–100)
MUCOUS THREADS #/AREA URNS LPF: PRESENT /LPF
NITRATE UR QL: NEGATIVE
PH UR STRIP: 6 [PH] (ref 5–7)
PLATELET # BLD AUTO: 265 10E3/UL (ref 150–450)
RBC # BLD AUTO: 3.76 10E6/UL (ref 3.8–5.2)
RBC #/AREA URNS AUTO: ABNORMAL /HPF
SP GR UR STRIP: 1.02 (ref 1–1.03)
SQUAMOUS #/AREA URNS AUTO: ABNORMAL /LPF
UROBILINOGEN UR STRIP-ACNC: 0.2 E.U./DL
WBC # BLD AUTO: 13.2 10E3/UL (ref 4–11)
WBC #/AREA URNS AUTO: ABNORMAL /HPF

## 2022-08-17 PROCEDURE — 81001 URINALYSIS AUTO W/SCOPE: CPT | Performed by: OBSTETRICS & GYNECOLOGY

## 2022-08-17 PROCEDURE — 87086 URINE CULTURE/COLONY COUNT: CPT | Performed by: OBSTETRICS & GYNECOLOGY

## 2022-08-17 PROCEDURE — 99207 PR PRENATAL VISIT: CPT | Performed by: OBSTETRICS & GYNECOLOGY

## 2022-08-17 PROCEDURE — 36415 COLL VENOUS BLD VENIPUNCTURE: CPT | Performed by: OBSTETRICS & GYNECOLOGY

## 2022-08-17 PROCEDURE — 85027 COMPLETE CBC AUTOMATED: CPT | Performed by: OBSTETRICS & GYNECOLOGY

## 2022-08-17 PROCEDURE — 86780 TREPONEMA PALLIDUM: CPT | Performed by: OBSTETRICS & GYNECOLOGY

## 2022-08-17 NOTE — PROGRESS NOTES
Concerns: tested fasting and 1 hour PP BS all within parameters  Doing well.  No concerns today.  No vaginal bleeding, no contractions, no leakage of fluid  No nausea/vomiting. No heartburn  No vaginal discharge. No dysuria.   No headache, vision changes, lower extremity swelling, upper abdominal pain, chest pain, shortness of breath  Reportable signs and symptoms discussed.  Tdap planned next visit  Discussed PTL, PROM, and when to call or come in.  Normal anatomy ultrasound.  RTC 4 weeks.  GTT and labs today       Darrell Montesinos MD

## 2022-08-18 LAB — T PALLIDUM AB SER QL: NONREACTIVE

## 2022-08-19 LAB — BACTERIA UR CULT: NORMAL

## 2022-09-14 ENCOUNTER — PRENATAL OFFICE VISIT (OUTPATIENT)
Dept: OBGYN | Facility: CLINIC | Age: 32
End: 2022-09-14
Payer: COMMERCIAL

## 2022-09-14 VITALS
BODY MASS INDEX: 39.16 KG/M2 | TEMPERATURE: 97.7 F | SYSTOLIC BLOOD PRESSURE: 110 MMHG | HEART RATE: 89 BPM | HEIGHT: 63 IN | WEIGHT: 221 LBS | DIASTOLIC BLOOD PRESSURE: 70 MMHG | RESPIRATION RATE: 18 BRPM

## 2022-09-14 DIAGNOSIS — Z34.93 PRENATAL CARE IN THIRD TRIMESTER: Primary | ICD-10-CM

## 2022-09-14 DIAGNOSIS — O09.813 PREGNANCY RESULTING FROM IN VITRO FERTILIZATION, THIRD TRIMESTER: ICD-10-CM

## 2022-09-14 PROCEDURE — 90715 TDAP VACCINE 7 YRS/> IM: CPT | Performed by: ADVANCED PRACTICE MIDWIFE

## 2022-09-14 PROCEDURE — 90471 IMMUNIZATION ADMIN: CPT | Performed by: ADVANCED PRACTICE MIDWIFE

## 2022-09-14 PROCEDURE — 99207 PR PRENATAL VISIT: CPT | Performed by: ADVANCED PRACTICE MIDWIFE

## 2022-09-14 NOTE — PROGRESS NOTES
Prior to immunization administration, verified patients identity using patient s name and date of birth. Please see Immunization Activity for additional information.     Screening Questionnaire for Adult Immunization    Are you sick today?   No   Do you have allergies to medications, food, a vaccine component or latex?   No   Have you ever had a serious reaction after receiving a vaccination?   No   Do you have a long-term health problem with heart, lung, kidney, or metabolic disease (e.g., diabetes), asthma, a blood disorder, no spleen, complement component deficiency, a cochlear implant, or a spinal fluid leak?  Are you on long-term aspirin therapy?   No   Do you have cancer, leukemia, HIV/AIDS, or any other immune system problem?   No   Do you have a parent, brother, or sister with an immune system problem?   No   In the past 3 months, have you taken medications that affect  your immune system, such as prednisone, other steroids, or anticancer drugs; drugs for the treatment of rheumatoid arthritis, Crohn s disease, or psoriasis; or have you had radiation treatments?   No   Have you had a seizure, or a brain or other nervous system problem?   No   During the past year, have you received a transfusion of blood or blood    products, or been given immune (gamma) globulin or antiviral drug?   No   For women: Are you pregnant or is there a chance you could become       pregnant during the next month?   yes   Have you received any vaccinations in the past 4 weeks?   No     Immunization questionnaire answers were all negative.        Per orders of Poornima Ferreira, injection of adacel   given by Yenni Salcedo CMA. Patient instructed to remain in clinic for 15 minutes afterwards, and to report any adverse reaction to me immediately.       Screening performed by Yenni Salcedo CMA on 9/14/2022 at 1:09 PM.

## 2022-09-14 NOTE — PROGRESS NOTES
Here with Gregg.  Feeling well.  Baby is active. Denies any leaking of fluid, vaginal bleeding, regular uterine contractions, or headaches or other concerns.  She is struggling with [place] in the night.  Discussed comfort measure and different strategies.   IVF pregnancy plan for fetal ECHO and BPPs at 36 weeks.    Reviewed to call for contractions, loss of fluid, vaginal bleeding, decreased fetal movement or any other questions or concerns.    RTC in 2 weeks.  Poornima Ferreira, KENIA, APRN, CNM

## 2022-09-14 NOTE — NURSING NOTE
"Initial /70 (BP Location: Left arm, Patient Position: Chair, Cuff Size: Adult Regular)   Pulse 89   Temp 97.7  F (36.5  C) (Tympanic)   Resp 18   Ht 1.588 m (5' 2.5\")   Wt 100.2 kg (221 lb)   LMP 07/09/2021   Breastfeeding No   BMI 39.78 kg/m   Estimated body mass index is 39.78 kg/m  as calculated from the following:    Height as of this encounter: 1.588 m (5' 2.5\").    Weight as of this encounter: 100.2 kg (221 lb). .      "

## 2022-09-18 ENCOUNTER — HEALTH MAINTENANCE LETTER (OUTPATIENT)
Age: 32
End: 2022-09-18

## 2022-09-27 ENCOUNTER — HOSPITAL ENCOUNTER (OUTPATIENT)
Dept: ULTRASOUND IMAGING | Facility: CLINIC | Age: 32
Discharge: HOME | End: 2022-09-27
Attending: OBSTETRICS & GYNECOLOGY | Admitting: OBSTETRICS & GYNECOLOGY
Payer: COMMERCIAL

## 2022-09-27 DIAGNOSIS — Z34.02 ENCOUNTER FOR SUPERVISION OF NORMAL FIRST PREGNANCY IN SECOND TRIMESTER: ICD-10-CM

## 2022-09-27 PROCEDURE — 76816 OB US FOLLOW-UP PER FETUS: CPT

## 2022-09-28 ENCOUNTER — PRENATAL OFFICE VISIT (OUTPATIENT)
Dept: OBGYN | Facility: CLINIC | Age: 32
End: 2022-09-28
Payer: COMMERCIAL

## 2022-09-28 VITALS
HEART RATE: 88 BPM | RESPIRATION RATE: 16 BRPM | WEIGHT: 224.6 LBS | SYSTOLIC BLOOD PRESSURE: 118 MMHG | BODY MASS INDEX: 39.8 KG/M2 | TEMPERATURE: 97.3 F | DIASTOLIC BLOOD PRESSURE: 73 MMHG | HEIGHT: 63 IN

## 2022-09-28 DIAGNOSIS — Z34.03 ENCOUNTER FOR SUPERVISION OF NORMAL FIRST PREGNANCY IN THIRD TRIMESTER: Primary | ICD-10-CM

## 2022-09-28 PROCEDURE — 99207 PR PRENATAL VISIT: CPT | Performed by: OBSTETRICS & GYNECOLOGY

## 2022-09-28 NOTE — PROGRESS NOTES
Concerns: completed her anatomic screen- normal  Doing well.  No concerns today.  No vaginal bleeding, LOF.  No contractions.  Discussed kick counts and fetal movement.  Reportable signs and symptoms discussed.  Discussed kick counts and fetal movement.  Discussed PTL, PROM, and when to call or come in.  RTC 2 weeks.    Darrell Montesinos MD

## 2022-10-12 ENCOUNTER — MYC MEDICAL ADVICE (OUTPATIENT)
Dept: OBGYN | Facility: CLINIC | Age: 32
End: 2022-10-12

## 2022-10-12 ENCOUNTER — PRENATAL OFFICE VISIT (OUTPATIENT)
Dept: OBGYN | Facility: CLINIC | Age: 32
End: 2022-10-12
Payer: COMMERCIAL

## 2022-10-12 VITALS
WEIGHT: 225 LBS | BODY MASS INDEX: 38.41 KG/M2 | HEART RATE: 86 BPM | SYSTOLIC BLOOD PRESSURE: 129 MMHG | RESPIRATION RATE: 18 BRPM | HEIGHT: 64 IN | DIASTOLIC BLOOD PRESSURE: 69 MMHG | TEMPERATURE: 97.5 F

## 2022-10-12 DIAGNOSIS — O09.813 PREGNANCY RESULTING FROM IN VITRO FERTILIZATION IN THIRD TRIMESTER: Primary | ICD-10-CM

## 2022-10-12 PROCEDURE — 99207 PR PRENATAL VISIT: CPT | Performed by: OBSTETRICS & GYNECOLOGY

## 2022-10-12 NOTE — NURSING NOTE
"Initial /69 (BP Location: Right arm, Patient Position: Chair, Cuff Size: Adult Regular)   Pulse 86   Temp 97.5  F (36.4  C) (Tympanic)   Resp 18   Ht 1.613 m (5' 3.5\")   Wt 102.1 kg (225 lb)   LMP 07/09/2021   BMI 39.23 kg/m   Estimated body mass index is 39.23 kg/m  as calculated from the following:    Height as of this encounter: 1.613 m (5' 3.5\").    Weight as of this encounter: 102.1 kg (225 lb). .      "

## 2022-10-12 NOTE — PROGRESS NOTES
"[facility]   OB/GYN Clinic     CC: F/U OB      Subjective:     Tammy is a 32 year old  at 34w5d by ETD, stated COLETTE for F/U OB visit. Denies any uterine cramping, abdominal pain or vaginal bleeding. +FM.      Objective:  /69 (BP Location: Right arm, Patient Position: Chair, Cuff Size: Adult Regular)   Pulse 86   Temp 97.5  F (36.4  C) (Tympanic)   Resp 18   Ht 1.613 m (5' 3.5\")   Wt 102.1 kg (225 lb)   LMP 2021   BMI 39.23 kg/m       Physical Exam:  Gen: Pleasant, talkative female in no apparent distress   Respiratory: breathing comfortably on room air   Cardiac: Regular rate, warm and well-perfused.   GI: Abd soft and non-tender  MSK: Grossly normal movement of all four extremities  Psych: mood and affect bright   Lower extremity: edema not present      See OB flowsheet      Assessment/Plan:   32 year old  at 34w5d by ETD c/w 10w1d US who presents for F/U OB visit.   1) Plan to draw new OB labs - normal   2) Declines NIPT.   3) IVF pregnancy; discussed L2 - declined and would prefer just a regular L1, declined - fetal ECHO, BPPs  4) Weight gain: discussed weight gain expectations (BMI >30: 11-20lbs) - hgba1c nl   5) Immunizations: flu shot s/p, Tdap at 28wks, COVID s/p with booster  6) Hx of UTI with GBS and would just like to be treated for GBS during labor.      Return to clinic weekly until delivery.        Stephanie De La Torre MD  OB/GYN  10/12/2022  "

## 2022-10-26 ENCOUNTER — PRENATAL OFFICE VISIT (OUTPATIENT)
Dept: OBGYN | Facility: CLINIC | Age: 32
End: 2022-10-26
Payer: COMMERCIAL

## 2022-10-26 ENCOUNTER — HOSPITAL ENCOUNTER (OUTPATIENT)
Dept: ULTRASOUND IMAGING | Facility: CLINIC | Age: 32
Discharge: HOME | End: 2022-10-26
Attending: OBSTETRICS & GYNECOLOGY | Admitting: OBSTETRICS & GYNECOLOGY
Payer: COMMERCIAL

## 2022-10-26 VITALS
TEMPERATURE: 97.1 F | HEIGHT: 64 IN | RESPIRATION RATE: 16 BRPM | WEIGHT: 229 LBS | HEART RATE: 82 BPM | BODY MASS INDEX: 39.09 KG/M2 | DIASTOLIC BLOOD PRESSURE: 64 MMHG | SYSTOLIC BLOOD PRESSURE: 108 MMHG

## 2022-10-26 DIAGNOSIS — Z34.03 ENCOUNTER FOR PRENATAL CARE IN THIRD TRIMESTER OF FIRST PREGNANCY: Primary | ICD-10-CM

## 2022-10-26 DIAGNOSIS — O09.813 PREGNANCY RESULTING FROM IN VITRO FERTILIZATION IN THIRD TRIMESTER: ICD-10-CM

## 2022-10-26 DIAGNOSIS — Z23 NEED FOR PROPHYLACTIC VACCINATION AND INOCULATION AGAINST INFLUENZA: ICD-10-CM

## 2022-10-26 PROCEDURE — 76819 FETAL BIOPHYS PROFIL W/O NST: CPT

## 2022-10-26 PROCEDURE — 99207 PR PRENATAL VISIT: CPT | Performed by: OBSTETRICS & GYNECOLOGY

## 2022-10-26 PROCEDURE — 90471 IMMUNIZATION ADMIN: CPT | Performed by: OBSTETRICS & GYNECOLOGY

## 2022-10-26 PROCEDURE — 90686 IIV4 VACC NO PRSV 0.5 ML IM: CPT | Performed by: OBSTETRICS & GYNECOLOGY

## 2022-10-26 NOTE — PROGRESS NOTES
"Initial /64 (BP Location: Left arm, Patient Position: Chair, Cuff Size: Adult Large)   Pulse 82   Temp 97.1  F (36.2  C) (Tympanic)   Resp 16   Ht 1.613 m (5' 3.5\")   Wt 103.9 kg (229 lb)   LMP 07/09/2021   BMI 39.93 kg/m   Estimated body mass index is 39.93 kg/m  as calculated from the following:    Height as of this encounter: 1.613 m (5' 3.5\").    Weight as of this encounter: 103.9 kg (229 lb). .      "

## 2022-10-26 NOTE — PROGRESS NOTES
"[facility]   OB/GYN Clinic     CC: F/U OB      Subjective:     Tammy is a 32 year old  at 36w5d by ETD, stated COLETTE for F/U OB visit. Denies any uterine cramping, abdominal pain or vaginal bleeding. +FM.      Objective:  /64 (BP Location: Left arm, Patient Position: Chair, Cuff Size: Adult Large)   Pulse 82   Temp 97.1  F (36.2  C) (Tympanic)   Resp 16   Ht 1.613 m (5' 3.5\")   Wt 103.9 kg (229 lb)   LMP 2021   BMI 39.93 kg/m       Physical Exam:  Gen: Pleasant, talkative female in no apparent distress   Respiratory: breathing comfortably on room air   Cardiac: Regular rate, warm and well-perfused.   GI: Abd soft and non-tender  MSK: Grossly normal movement of all four extremities  Psych: mood and affect bright   Lower extremity: edema not present      See OB flowsheet      Assessment/Plan:   32 year old  at 36w5d by ETD c/w 10w1d US who presents for F/U OB visit.   1) Plan to draw new OB labs - normal   2) Declines NIPT.   3) IVF pregnancy; discussed L2 - declined and would prefer just a regular L1, declined - fetal ECHO, BPPs  4) Weight gain: discussed weight gain expectations (BMI >30: 11-20lbs) - hgba1c nl   5) Immunizations: flu shot s/p, Tdap at 28wks, COVID s/p with booster  6) Hx of UTI with GBS and would just like to be treated for GBS during labor. Knows 4 hrs of antibiotics.   7) Wants to pump exclusively, likely epidural, knows skin to skin    Return to clinic weekly until delivery. Discussed FM and labor precautions. OB Triage # given.      Stephanie De La Torre MD  OB/GYN  10/26/2022  "

## 2022-11-02 ENCOUNTER — HOSPITAL ENCOUNTER (OUTPATIENT)
Dept: ULTRASOUND IMAGING | Facility: CLINIC | Age: 32
Discharge: HOME | End: 2022-11-02
Attending: OBSTETRICS & GYNECOLOGY | Admitting: OBSTETRICS & GYNECOLOGY
Payer: COMMERCIAL

## 2022-11-02 ENCOUNTER — PRENATAL OFFICE VISIT (OUTPATIENT)
Dept: OBGYN | Facility: CLINIC | Age: 32
End: 2022-11-02
Payer: COMMERCIAL

## 2022-11-02 VITALS
BODY MASS INDEX: 38.58 KG/M2 | HEIGHT: 64 IN | HEART RATE: 75 BPM | WEIGHT: 226 LBS | DIASTOLIC BLOOD PRESSURE: 68 MMHG | SYSTOLIC BLOOD PRESSURE: 119 MMHG | TEMPERATURE: 97.4 F | RESPIRATION RATE: 18 BRPM

## 2022-11-02 DIAGNOSIS — O09.813 PREGNANCY RESULTING FROM IN VITRO FERTILIZATION IN THIRD TRIMESTER: ICD-10-CM

## 2022-11-02 DIAGNOSIS — Z34.03 ENCOUNTER FOR PRENATAL CARE IN THIRD TRIMESTER OF FIRST PREGNANCY: Primary | ICD-10-CM

## 2022-11-02 PROCEDURE — 99207 PR PRENATAL VISIT: CPT | Performed by: OBSTETRICS & GYNECOLOGY

## 2022-11-02 PROCEDURE — 76819 FETAL BIOPHYS PROFIL W/O NST: CPT

## 2022-11-02 NOTE — PROGRESS NOTES
"[facility]   OB/GYN Clinic     CC: F/U OB      Subjective:     Tammy is a 32 year old  at 37w5d by ETD, stated COLETTE for F/U OB visit. Denies any uterine cramping, abdominal pain or vaginal bleeding. +FM.      Objective:  /68 (BP Location: Right arm, Patient Position: Chair, Cuff Size: Adult Regular)   Pulse 75   Temp 97.4  F (36.3  C) (Tympanic)   Resp 18   Ht 1.613 m (5' 3.5\")   Wt 102.5 kg (226 lb)   LMP 2021   BMI 39.41 kg/m       Physical Exam:  Gen: Pleasant, talkative female in no apparent distress   Respiratory: breathing comfortably on room air   Cardiac: Regular rate, warm and well-perfused.   GI: Abd soft and non-tender  MSK: Grossly normal movement of all four extremities  Psych: mood and affect bright   Lower extremity: edema not present      See OB flowsheet      Assessment/Plan:   32 year old  at 37w5d by ETD c/w 10w1d US who presents for F/U OB visit.   1) Plan to draw new OB labs - normal   2) Declines NIPT.   3) IVF pregnancy; discussed L2 - declined and would prefer just a regular L1, declined - fetal ECHO, BPPs. Discussed option of IOL at 39wks for IVF. Discussed likely steps of cervical ripening with miso or cervidil. Knows pitocin once favorable. Will think about things.   4) Weight gain: discussed weight gain expectations (BMI >30: 11-20lbs) - hgba1c nl   5) Immunizations: flu shot s/p, Tdap at 28wks, COVID s/p with booster  6) Hx of UTI with GBS and would just like to be treated for GBS during labor. Knows 4 hrs of antibiotics.   7) Wants to pump exclusively (wants to put baby to the breast in the hospital, hoping to work with lactation), likely epidural, knows skin to skin, would want to move to a c/s if there is any concern for baby or labor.      Return to clinic weekly until delivery. Discussed FM and labor precautions. OB Triage # given.      Stephanie De La Torre MD  OB/GYN  2022  "

## 2022-11-02 NOTE — NURSING NOTE
"Initial /68 (BP Location: Right arm, Patient Position: Chair, Cuff Size: Adult Regular)   Pulse 75   Temp 97.4  F (36.3  C) (Tympanic)   Resp 18   Ht 1.613 m (5' 3.5\")   Wt 102.5 kg (226 lb)   LMP 07/09/2021   BMI 39.41 kg/m   Estimated body mass index is 39.41 kg/m  as calculated from the following:    Height as of this encounter: 1.613 m (5' 3.5\").    Weight as of this encounter: 102.5 kg (226 lb). .      "

## 2022-11-09 ENCOUNTER — HOSPITAL ENCOUNTER (OUTPATIENT)
Dept: ULTRASOUND IMAGING | Facility: CLINIC | Age: 32
Discharge: HOME | End: 2022-11-09
Attending: OBSTETRICS & GYNECOLOGY | Admitting: OBSTETRICS & GYNECOLOGY
Payer: COMMERCIAL

## 2022-11-09 ENCOUNTER — PRENATAL OFFICE VISIT (OUTPATIENT)
Dept: OBGYN | Facility: CLINIC | Age: 32
End: 2022-11-09
Payer: COMMERCIAL

## 2022-11-09 VITALS
TEMPERATURE: 98 F | RESPIRATION RATE: 14 BRPM | HEART RATE: 87 BPM | SYSTOLIC BLOOD PRESSURE: 118 MMHG | DIASTOLIC BLOOD PRESSURE: 77 MMHG | BODY MASS INDEX: 39.34 KG/M2 | WEIGHT: 230.4 LBS | HEIGHT: 64 IN

## 2022-11-09 DIAGNOSIS — Z34.03 ENCOUNTER FOR PRENATAL CARE IN THIRD TRIMESTER OF FIRST PREGNANCY: Primary | ICD-10-CM

## 2022-11-09 DIAGNOSIS — O09.813 PREGNANCY RESULTING FROM IN VITRO FERTILIZATION IN THIRD TRIMESTER: ICD-10-CM

## 2022-11-09 PROCEDURE — 99207 PR PRENATAL VISIT: CPT | Performed by: OBSTETRICS & GYNECOLOGY

## 2022-11-09 PROCEDURE — 76819 FETAL BIOPHYS PROFIL W/O NST: CPT

## 2022-11-09 NOTE — PROGRESS NOTES
"Initial /77 (BP Location: Left arm, Patient Position: Chair, Cuff Size: Adult Regular)   Pulse 87   Temp 98  F (36.7  C) (Tympanic)   Resp 14   Ht 1.613 m (5' 3.5\")   Wt 104.5 kg (230 lb 6.4 oz)   LMP 07/09/2021   BMI 40.17 kg/m   Estimated body mass index is 40.17 kg/m  as calculated from the following:    Height as of this encounter: 1.613 m (5' 3.5\").    Weight as of this encounter: 104.5 kg (230 lb 6.4 oz). .      "

## 2022-11-09 NOTE — PROGRESS NOTES
"[facility]   OB/GYN Clinic    CC: Return OB     Subjective:    Tammy is a 32 year old  at 38w5d by Patient's last menstrual period was 2021. who presents for return OB visit. She reports feeling well. Denies uterine cramping, vaginal bleeding or leaking, dysuria. +fetal movement. Declined cervical exam.    Pt reports she feels safe at home and mood is \"just fine\".     Concerns: Partner reports she has been sneezing a little more than usual. Patient is not concerned. Provided reassurance.    Objective:  /77 (BP Location: Left arm, Patient Position: Chair, Cuff Size: Adult Regular)   Pulse 87   Temp 98  F (36.7  C) (Tympanic)   Resp 14   Ht 1.613 m (5' 3.5\")   Wt 104.5 kg (230 lb 6.4 oz)   LMP 2021   BMI 40.17 kg/m      Estimated body mass index is 40.17 kg/m  as calculated from the following:    Height as of this encounter: 1.613 m (5' 3.5\").    Weight as of this encounter: 104.5 kg (230 lb 6.4 oz).    Physical Exam:  Gen: Pleasant, talkative female in no apparent distress   Respiratory: breathing comfortably on room air   Cardiac: Warm and well-perfused.   GI: Abd soft and non-tender, gravid  MSK: Grossly normal movement of all four extremities  Psych: mood and affect bright     Fetal dop tones: 138 bpm  Fundal height: 39 cm    Assessment/Plan:   32 year old  at 38w5d by LMP of Patient's last menstrual period was 2021. who presents for follow-up OB visit.   1) Plan to draw new OB labs - normal   2) Declines NIPT.   3) IVF pregnancy; discussed L2 - declined and would prefer just a regular L1, declined - fetal ECHO, BPPs. Discussed option of IOL at 39wks for IVF. Discussed likely steps of cervical ripening with miso or cervidil. Knows pitocin once favorable. Will think about things.   4) Weight gain: discussed weight gain expectations (BMI >30: 11-20lbs) - hgba1c nl   5) Immunizations: flu shot s/p, Tdap at 28wks, COVID s/p with booster  6) 4x daily blood " glucose checks nl (instead of GCT) - all normal results   7) Hx of UTI with GBS and would just like to be treated for GBS during labor. Knows 4 hrs of antibiotics.   8) Wants to pump exclusively (wants to put baby to the breast in the hospital, hoping to work with lactation), likely epidural, knows skin to skin, would want to move to a c/s if there is any concern for baby or labor. Discussed recommendation for IOL at 41wks at the latest.     Return to clinic in 1 week.    Dayana Walker, MS3    I agree with the medical student's documentation above and have edited it for accuracy. I was present for and performed the physical exam and interview of the patient myself. All medical decision-making was performed by me. If a procedure was performed, that was performed by me. Additionally, if billing is based on time, I am only using the time that I personally spent with the patient in my time statement.     Stephanie De La Torre MD  OB/GYN

## 2022-11-16 ENCOUNTER — HOSPITAL ENCOUNTER (OUTPATIENT)
Dept: ULTRASOUND IMAGING | Facility: CLINIC | Age: 32
Discharge: HOME | End: 2022-11-16
Attending: OBSTETRICS & GYNECOLOGY | Admitting: OBSTETRICS & GYNECOLOGY
Payer: COMMERCIAL

## 2022-11-16 ENCOUNTER — PRENATAL OFFICE VISIT (OUTPATIENT)
Dept: OBGYN | Facility: CLINIC | Age: 32
End: 2022-11-16
Payer: COMMERCIAL

## 2022-11-16 VITALS
HEIGHT: 64 IN | SYSTOLIC BLOOD PRESSURE: 118 MMHG | BODY MASS INDEX: 38.98 KG/M2 | RESPIRATION RATE: 14 BRPM | HEART RATE: 92 BPM | WEIGHT: 228.3 LBS | TEMPERATURE: 97.3 F | DIASTOLIC BLOOD PRESSURE: 68 MMHG

## 2022-11-16 DIAGNOSIS — Z34.03 ENCOUNTER FOR PRENATAL CARE IN THIRD TRIMESTER OF FIRST PREGNANCY: Primary | ICD-10-CM

## 2022-11-16 DIAGNOSIS — O09.813 PREGNANCY RESULTING FROM IN VITRO FERTILIZATION IN THIRD TRIMESTER: ICD-10-CM

## 2022-11-16 PROCEDURE — 99207 PR PRENATAL VISIT: CPT | Performed by: OBSTETRICS & GYNECOLOGY

## 2022-11-16 PROCEDURE — 76819 FETAL BIOPHYS PROFIL W/O NST: CPT

## 2022-11-16 NOTE — PROGRESS NOTES
"Initial /68 (BP Location: Left arm, Patient Position: Chair, Cuff Size: Adult Regular)   Pulse 92   Temp 97.3  F (36.3  C) (Tympanic)   Resp 14   Ht 1.613 m (5' 3.5\")   Wt 103.6 kg (228 lb 4.8 oz)   LMP 07/09/2021   BMI 39.81 kg/m   Estimated body mass index is 39.81 kg/m  as calculated from the following:    Height as of this encounter: 1.613 m (5' 3.5\").    Weight as of this encounter: 103.6 kg (228 lb 4.8 oz). .      "

## 2022-11-16 NOTE — PROGRESS NOTES
"CC: Return OB      Subjective:     Tammy is a 32 year old  at 39w5d patient's last menstrual period was 2021, who presents for return OB visit. She reports feeling well. Denies uterine cramping, vaginal bleeding or leaking, dysuria. +fetal movement.     Pt reports she feels safe at home and mood is \"just fine\".      Concerns: discussed IOL for  and pp-contraception. No other concerns.     Objective:  Vital signs:  Temp: 97.3  F (36.3  C) Temp src: Tympanic BP: 118/68 Pulse: 92   Resp: 14       Height: 161.3 cm (5' 3.5\") Weight: 103.6 kg (228 lb 4.8 oz)  Estimated body mass index is 39.81 kg/m  as calculated from the following:    Height as of this encounter: 1.613 m (5' 3.5\").    Weight as of this encounter: 103.6 kg (228 lb 4.8 oz).     Physical Exam:  Gen: Pleasant, talkative female in no apparent distress   Respiratory: breathing comfortably on room air   Cardiac: Warm and well-perfused.   GI: Abd soft and non-tender, gravid  MSK: Grossly normal movement of all four extremities, no JOSSELIN  Psych: mood and affect bright      See OB flowsheet      Assessment/Plan:   32 year old  at 39w5d by LMP of Patient's last menstrual period was 2021, who presents for follow-up OB visit.   1) New OB labs - normal   2) Declined NIPT.   3) IVF pregnancy; discussed L2 - declined and would prefer just a regular L1, declined - fetal ECHO, BPPs. Discussed option of IOL at 39wks for IVF. Discussed likely steps of cervical ripening with miso or cervidil. Knows pitocin once favorable. Will think about things.   4) Weight gain: discussed weight gain expectations (BMI >30: 11-20lbs) - hgba1c nl   5) Immunizations: flu shot s/p, Tdap at 28wks, COVID s/p with booster  6) 4x daily blood glucose checks nl (instead of GCT) - all normal results   7) Hx of UTI with GBS and would just like to be treated for GBS during labor with PCN. Knows 4 hrs of antibiotics.   8) Wants to pump exclusively (wants to put baby to the breast " in the hospital, hoping to work with lactation), likely epidural, knows skin to skin, would want to move to a c/s if there is any concern for baby or labor. Discussed contraception, leaning towards Nexplanon.   9) IOL scheduled for 11/21/2022.     Return to clinic in 1 week.    This patient was seen and discussed with my attending physician.  Brando Hancock, M3     I agree with the medical student's documentation above and have edited it for accuracy. I was present for and performed the physical exam and interview of the patient myself. All medical decision-making was performed by me. If a procedure was performed, that was performed by me. Additionally, if billing is based on time, I am only using the time that I personally spent with the patient in my time statement.     Stephanie De La Torre MD  OB/GYN  11/16/2022

## 2022-11-21 ENCOUNTER — HOSPITAL ENCOUNTER (INPATIENT)
Facility: CLINIC | Age: 32
LOS: 3 days | Discharge: HOME | End: 2022-11-24
Attending: OBSTETRICS & GYNECOLOGY | Admitting: OBSTETRICS & GYNECOLOGY
Payer: COMMERCIAL

## 2022-11-21 PROBLEM — Z34.90 ENCOUNTER FOR PLANNED INDUCTION OF LABOR: Status: ACTIVE | Noted: 2022-11-21

## 2022-11-21 LAB
ABO/RH(D): NORMAL
ANTIBODY SCREEN: NEGATIVE
BASOPHILS # BLD AUTO: 0 10E3/UL (ref 0–0.2)
BASOPHILS NFR BLD AUTO: 0 %
EOSINOPHIL # BLD AUTO: 0.2 10E3/UL (ref 0–0.7)
EOSINOPHIL NFR BLD AUTO: 1 %
ERYTHROCYTE [DISTWIDTH] IN BLOOD BY AUTOMATED COUNT: 13.2 % (ref 10–15)
HCT VFR BLD AUTO: 37.1 % (ref 35–47)
HGB BLD-MCNC: 12 G/DL (ref 11.7–15.7)
IMM GRANULOCYTES # BLD: 0.1 10E3/UL
IMM GRANULOCYTES NFR BLD: 0 %
LYMPHOCYTES # BLD AUTO: 2.3 10E3/UL (ref 0.8–5.3)
LYMPHOCYTES NFR BLD AUTO: 17 %
MCH RBC QN AUTO: 29.8 PG (ref 26.5–33)
MCHC RBC AUTO-ENTMCNC: 32.3 G/DL (ref 31.5–36.5)
MCV RBC AUTO: 92 FL (ref 78–100)
MONOCYTES # BLD AUTO: 0.6 10E3/UL (ref 0–1.3)
MONOCYTES NFR BLD AUTO: 4 %
NEUTROPHILS # BLD AUTO: 10.4 10E3/UL (ref 1.6–8.3)
NEUTROPHILS NFR BLD AUTO: 78 %
NRBC # BLD AUTO: 0 10E3/UL
NRBC BLD AUTO-RTO: 0 /100
PLATELET # BLD AUTO: 235 10E3/UL (ref 150–450)
RBC # BLD AUTO: 4.03 10E6/UL (ref 3.8–5.2)
SARS-COV-2 RNA RESP QL NAA+PROBE: NEGATIVE
SPECIMEN EXPIRATION DATE: NORMAL
T PALLIDUM AB SER QL: NONREACTIVE
WBC # BLD AUTO: 13.5 10E3/UL (ref 4–11)

## 2022-11-21 PROCEDURE — 86901 BLOOD TYPING SEROLOGIC RH(D): CPT | Performed by: OBSTETRICS & GYNECOLOGY

## 2022-11-21 PROCEDURE — 86850 RBC ANTIBODY SCREEN: CPT | Performed by: OBSTETRICS & GYNECOLOGY

## 2022-11-21 PROCEDURE — 120N000001 HC R&B MED SURG/OB

## 2022-11-21 PROCEDURE — 87635 SARS-COV-2 COVID-19 AMP PRB: CPT | Performed by: OBSTETRICS & GYNECOLOGY

## 2022-11-21 PROCEDURE — 85004 AUTOMATED DIFF WBC COUNT: CPT | Performed by: OBSTETRICS & GYNECOLOGY

## 2022-11-21 PROCEDURE — 86780 TREPONEMA PALLIDUM: CPT | Performed by: OBSTETRICS & GYNECOLOGY

## 2022-11-21 PROCEDURE — 250N000013 HC RX MED GY IP 250 OP 250 PS 637: Performed by: OBSTETRICS & GYNECOLOGY

## 2022-11-21 PROCEDURE — 99221 1ST HOSP IP/OBS SF/LOW 40: CPT | Performed by: OBSTETRICS & GYNECOLOGY

## 2022-11-21 RX ORDER — PROCHLORPERAZINE MALEATE 10 MG
10 TABLET ORAL EVERY 6 HOURS PRN
Status: DISCONTINUED | OUTPATIENT
Start: 2022-11-21 | End: 2022-11-22 | Stop reason: HOSPADM

## 2022-11-21 RX ORDER — KETOROLAC TROMETHAMINE 30 MG/ML
30 INJECTION, SOLUTION INTRAMUSCULAR; INTRAVENOUS
Status: DISCONTINUED | OUTPATIENT
Start: 2022-11-21 | End: 2022-11-24 | Stop reason: HOSPADM

## 2022-11-21 RX ORDER — NALOXONE HYDROCHLORIDE 0.4 MG/ML
0.4 INJECTION, SOLUTION INTRAMUSCULAR; INTRAVENOUS; SUBCUTANEOUS
Status: DISCONTINUED | OUTPATIENT
Start: 2022-11-21 | End: 2022-11-22 | Stop reason: HOSPADM

## 2022-11-21 RX ORDER — PENICILLIN G POTASSIUM 5000000 [IU]/1
5 INJECTION, POWDER, FOR SOLUTION INTRAMUSCULAR; INTRAVENOUS ONCE
Status: COMPLETED | OUTPATIENT
Start: 2022-11-21 | End: 2022-11-22

## 2022-11-21 RX ORDER — OXYTOCIN/0.9 % SODIUM CHLORIDE 30/500 ML
340 PLASTIC BAG, INJECTION (ML) INTRAVENOUS CONTINUOUS PRN
Status: DISCONTINUED | OUTPATIENT
Start: 2022-11-21 | End: 2022-11-22 | Stop reason: HOSPADM

## 2022-11-21 RX ORDER — IBUPROFEN 800 MG/1
800 TABLET, FILM COATED ORAL
Status: DISCONTINUED | OUTPATIENT
Start: 2022-11-21 | End: 2022-11-24 | Stop reason: HOSPADM

## 2022-11-21 RX ORDER — METOCLOPRAMIDE HYDROCHLORIDE 5 MG/ML
10 INJECTION INTRAMUSCULAR; INTRAVENOUS EVERY 6 HOURS PRN
Status: DISCONTINUED | OUTPATIENT
Start: 2022-11-21 | End: 2022-11-22 | Stop reason: HOSPADM

## 2022-11-21 RX ORDER — PROCHLORPERAZINE 25 MG
25 SUPPOSITORY, RECTAL RECTAL EVERY 12 HOURS PRN
Status: DISCONTINUED | OUTPATIENT
Start: 2022-11-21 | End: 2022-11-22 | Stop reason: HOSPADM

## 2022-11-21 RX ORDER — MISOPROSTOL 200 UG/1
400 TABLET ORAL
Status: DISCONTINUED | OUTPATIENT
Start: 2022-11-21 | End: 2022-11-22 | Stop reason: HOSPADM

## 2022-11-21 RX ORDER — ONDANSETRON 4 MG/1
4 TABLET, ORALLY DISINTEGRATING ORAL EVERY 6 HOURS PRN
Status: DISCONTINUED | OUTPATIENT
Start: 2022-11-21 | End: 2022-11-22 | Stop reason: HOSPADM

## 2022-11-21 RX ORDER — NALOXONE HYDROCHLORIDE 0.4 MG/ML
0.2 INJECTION, SOLUTION INTRAMUSCULAR; INTRAVENOUS; SUBCUTANEOUS
Status: DISCONTINUED | OUTPATIENT
Start: 2022-11-21 | End: 2022-11-22 | Stop reason: HOSPADM

## 2022-11-21 RX ORDER — FENTANYL CITRATE 50 UG/ML
100 INJECTION, SOLUTION INTRAMUSCULAR; INTRAVENOUS
Status: DISCONTINUED | OUTPATIENT
Start: 2022-11-21 | End: 2022-11-22 | Stop reason: HOSPADM

## 2022-11-21 RX ORDER — PENICILLIN G 3000000 [IU]/50ML
3 INJECTION, SOLUTION INTRAVENOUS EVERY 4 HOURS
Status: DISCONTINUED | OUTPATIENT
Start: 2022-11-21 | End: 2022-11-22 | Stop reason: HOSPADM

## 2022-11-21 RX ORDER — OXYTOCIN 10 [USP'U]/ML
10 INJECTION, SOLUTION INTRAMUSCULAR; INTRAVENOUS
Status: DISCONTINUED | OUTPATIENT
Start: 2022-11-21 | End: 2022-11-24 | Stop reason: HOSPADM

## 2022-11-21 RX ORDER — MISOPROSTOL 100 UG/1
25 TABLET ORAL
Status: COMPLETED | OUTPATIENT
Start: 2022-11-21 | End: 2022-11-22

## 2022-11-21 RX ORDER — OXYTOCIN 10 [USP'U]/ML
10 INJECTION, SOLUTION INTRAMUSCULAR; INTRAVENOUS
Status: DISCONTINUED | OUTPATIENT
Start: 2022-11-21 | End: 2022-11-22 | Stop reason: HOSPADM

## 2022-11-21 RX ORDER — MISOPROSTOL 200 UG/1
800 TABLET ORAL
Status: DISCONTINUED | OUTPATIENT
Start: 2022-11-21 | End: 2022-11-22 | Stop reason: HOSPADM

## 2022-11-21 RX ORDER — TRANEXAMIC ACID 10 MG/ML
1 INJECTION, SOLUTION INTRAVENOUS EVERY 30 MIN PRN
Status: DISCONTINUED | OUTPATIENT
Start: 2022-11-21 | End: 2022-11-22 | Stop reason: HOSPADM

## 2022-11-21 RX ORDER — LIDOCAINE 40 MG/G
CREAM TOPICAL
Status: DISCONTINUED | OUTPATIENT
Start: 2022-11-21 | End: 2022-11-22 | Stop reason: HOSPADM

## 2022-11-21 RX ORDER — MORPHINE SULFATE 10 MG/ML
10 INJECTION, SOLUTION INTRAMUSCULAR; INTRAVENOUS ONCE
Status: DISCONTINUED | OUTPATIENT
Start: 2022-11-22 | End: 2022-11-24 | Stop reason: HOSPADM

## 2022-11-21 RX ORDER — ONDANSETRON 2 MG/ML
4 INJECTION INTRAMUSCULAR; INTRAVENOUS EVERY 6 HOURS PRN
Status: DISCONTINUED | OUTPATIENT
Start: 2022-11-21 | End: 2022-11-22 | Stop reason: HOSPADM

## 2022-11-21 RX ORDER — METOCLOPRAMIDE 10 MG/1
10 TABLET ORAL EVERY 6 HOURS PRN
Status: DISCONTINUED | OUTPATIENT
Start: 2022-11-21 | End: 2022-11-22 | Stop reason: HOSPADM

## 2022-11-21 RX ORDER — CARBOPROST TROMETHAMINE 250 UG/ML
250 INJECTION, SOLUTION INTRAMUSCULAR
Status: DISCONTINUED | OUTPATIENT
Start: 2022-11-21 | End: 2022-11-22 | Stop reason: HOSPADM

## 2022-11-21 RX ORDER — HYDROXYZINE HYDROCHLORIDE 50 MG/1
50 TABLET, FILM COATED ORAL
Status: DISCONTINUED | OUTPATIENT
Start: 2022-11-21 | End: 2022-11-22 | Stop reason: HOSPADM

## 2022-11-21 RX ORDER — METHYLERGONOVINE MALEATE 0.2 MG/ML
200 INJECTION INTRAVENOUS
Status: DISCONTINUED | OUTPATIENT
Start: 2022-11-21 | End: 2022-11-22 | Stop reason: HOSPADM

## 2022-11-21 RX ORDER — CITRIC ACID/SODIUM CITRATE 334-500MG
30 SOLUTION, ORAL ORAL
Status: DISCONTINUED | OUTPATIENT
Start: 2022-11-21 | End: 2022-11-22 | Stop reason: HOSPADM

## 2022-11-21 RX ORDER — OXYTOCIN/0.9 % SODIUM CHLORIDE 30/500 ML
100-340 PLASTIC BAG, INJECTION (ML) INTRAVENOUS CONTINUOUS PRN
Status: DISCONTINUED | OUTPATIENT
Start: 2022-11-21 | End: 2022-11-24 | Stop reason: HOSPADM

## 2022-11-21 RX ADMIN — Medication 25 MCG: at 09:14

## 2022-11-21 RX ADMIN — HYDROXYZINE HYDROCHLORIDE 50 MG: 50 TABLET, FILM COATED ORAL at 23:52

## 2022-11-21 RX ADMIN — Medication 25 MCG: at 21:32

## 2022-11-21 RX ADMIN — Medication 25 MCG: at 15:20

## 2022-11-21 RX ADMIN — Medication 25 MCG: at 23:22

## 2022-11-21 RX ADMIN — Medication 25 MCG: at 19:28

## 2022-11-21 RX ADMIN — Medication 25 MCG: at 13:20

## 2022-11-21 RX ADMIN — Medication 25 MCG: at 11:16

## 2022-11-21 RX ADMIN — Medication 25 MCG: at 17:23

## 2022-11-21 ASSESSMENT — ACTIVITIES OF DAILY LIVING (ADL)
ADLS_ACUITY_SCORE: 20
FALL_HISTORY_WITHIN_LAST_SIX_MONTHS: NO
ADLS_ACUITY_SCORE: 20
DIFFICULTY_EATING/SWALLOWING: NO
ADLS_ACUITY_SCORE: 20
DRESSING/BATHING_DIFFICULTY: NO
ADLS_ACUITY_SCORE: 20
WALKING_OR_CLIMBING_STAIRS_DIFFICULTY: NO
ADLS_ACUITY_SCORE: 20
ADLS_ACUITY_SCORE: 20
WEAR_GLASSES_OR_BLIND: YES
CONCENTRATING,_REMEMBERING_OR_MAKING_DECISIONS_DIFFICULTY: NO
TOILETING_ISSUES: NO
CHANGE_IN_FUNCTIONAL_STATUS_SINCE_ONSET_OF_CURRENT_ILLNESS/INJURY: NO
DOING_ERRANDS_INDEPENDENTLY_DIFFICULTY: NO

## 2022-11-21 NOTE — TELEPHONE ENCOUNTER
Pre-procedure call for induction to be done at 0700 11/21. Instructed to call in morning prior to coming in. Procedure explained and questions answered.

## 2022-11-21 NOTE — H&P
[facility] Labor and Delivery H&P  2022  Tammy Zaragoza  [id number]      HPI: Tammy Zaragoza is a 32 year old  at 40w3d by ETD who presents for induction of labor due to IVF pregnancy.     She states that she is feeling well today.  +FM, no ctx, VB, or LOF.     Pregnancy notable for:  --IVF pregnancy, declined L2 or fetal ECHO, has had normal BPPs since 36wks  --Hx of GBS UTI previously, requests PCN in labor not in accordance with typical GBS algorithm      OBHX:   OB History    Para Term  AB Living   1 0 0 0 0 0   SAB IAB Ectopic Multiple Live Births   0 0 0 0 0      # Outcome Date GA Lbr Jagjit/2nd Weight Sex Delivery Anes PTL Lv   1 Current                MedicalHX:   Past Medical History:   Diagnosis Date     Chickenpox      Osteoarthritis     right foot       SurgicalHX:   Past Surgical History:   Procedure Laterality Date     wisdom teeth         Medications:   No current facility-administered medications on file prior to encounter.  butalbital-acetaminophen-caffeine (ESGIC) -40 MG tablet, Take 1 tablet by mouth every 4 hours as needed for headaches (Patient not taking: Reported on 2022)  fish oil-omega-3 fatty acids 1000 MG capsule, Take 2 g by mouth daily (Patient not taking: Reported on 2022)  magnesium oxide 400 MG CAPS,   ondansetron (ZOFRAN) 4 MG tablet, Take 4 mg by mouth every 6 hours as needed for nausea (Patient not taking: Reported on 2022)  Prenatal Vit-Fe Fumarate-FA (PRENATAL MULTIVITAMIN W/IRON) 27-0.8 MG tablet, Take 1 tablet by mouth daily  vitamin D3 (CHOLECALCIFEROL) 50 mcg (2000 units) tablet, Take 1 tablet by mouth daily (Patient not taking: Reported on 2022)        Allergies:  Allergies   Allergen Reactions     Doxycycline Difficulty breathing and Other (See Comments)     Norco [Hydrocodone-Acetaminophen] Itching     And constipated     Tape [Adhesive Tape]      Chlorhexidine Rash     Rash on skin       FamilyHX:  Family History    Problem Relation Age of Onset     No Known Problems Mother      No Known Problems Father      No Known Problems Sister      Colon Cancer Maternal Grandfather      Breast Cancer Paternal Grandmother      Coronary Artery Disease Paternal Grandfather         MI       SocialHX:   Social History     Socioeconomic History     Marital status:    Tobacco Use     Smoking status: Never     Smokeless tobacco: Never   Vaping Use     Vaping Use: Never used   Substance and Sexual Activity     Alcohol use: Not Currently     Comment: occas-quit with pregnancy     Drug use: No     Sexual activity: Yes     Partners: Male     Birth control/protection: None       ROS: 10-point ROS negative except as in HPI     Physical Exam:  /82, P 94  GEN: resting comfortably in bed, NAD   CV: Reg rate, warm and well-perfused   PULM: CTAB, no increased work of breathing, no cough/wheeze   ABD: soft, gravid, non-tender, non-distended  EXT: no edema, non-tender to palpation  CVX: /-2  Presentation: cephalic by leopolds  EFW: 7.5 lbs  Membranes: intact    NST:  FHT: baseline 155, mod variability, + accels, no decels  TOCO: 1 in 10 min     Labs:    Lab Results   Component Value Date    AS Negative 2022    HEPBANG Nonreactive 2022    HGB 12.0 2022       Lab Results   Component Value Date    PAP NIL 2021       A/P: Tammy Zaragoza is a 32 year old  at 40w3d by ETD who presents for induction of labor due to IVF pregnancy.     Admit to L&D. Place PIV. Draw labs: T&S, CBC, RPR  Labor: Anticipate . Plan for misoprostol for cervical ripening, then pitocin once favorable.   FWB: Category 1 FHT.  Continue EFM and toco  Pain: At maternal discretion  Plan PCN for hx oF GBS+ UTI    Stephanie De La Torre MD  OB/GYN

## 2022-11-21 NOTE — PROGRESS NOTES
Pt arrived to Unit for sched post dates induction.  Vss, afebrile.  Put on eum/us.  Sve 1/50/-2  Rausch of 4.  Educated pt on plan of cares.  Pt stable.

## 2022-11-21 NOTE — PROGRESS NOTES
First dose of miso started per reid score of 4 and Dr De La Torre order.  Pt verbalized understanding of plan of cares.  Pt stable.

## 2022-11-22 ENCOUNTER — ANESTHESIA EVENT (OUTPATIENT)
Dept: OBGYN | Facility: CLINIC | Age: 32
End: 2022-11-22
Payer: COMMERCIAL

## 2022-11-22 ENCOUNTER — ANESTHESIA (OUTPATIENT)
Dept: OBGYN | Facility: CLINIC | Age: 32
End: 2022-11-22
Payer: COMMERCIAL

## 2022-11-22 PROCEDURE — 3E0R3BZ INTRODUCTION OF ANESTHETIC AGENT INTO SPINAL CANAL, PERCUTANEOUS APPROACH: ICD-10-PCS | Performed by: NURSE ANESTHETIST, CERTIFIED REGISTERED

## 2022-11-22 PROCEDURE — 250N000009 HC RX 250: Performed by: NURSE ANESTHETIST, CERTIFIED REGISTERED

## 2022-11-22 PROCEDURE — 258N000003 HC RX IP 258 OP 636: Performed by: OBSTETRICS & GYNECOLOGY

## 2022-11-22 PROCEDURE — 250N000013 HC RX MED GY IP 250 OP 250 PS 637: Performed by: OBSTETRICS & GYNECOLOGY

## 2022-11-22 PROCEDURE — 59400 OBSTETRICAL CARE: CPT | Performed by: OBSTETRICS & GYNECOLOGY

## 2022-11-22 PROCEDURE — 250N000011 HC RX IP 250 OP 636: Performed by: OBSTETRICS & GYNECOLOGY

## 2022-11-22 PROCEDURE — 250N000011 HC RX IP 250 OP 636: Performed by: NURSE ANESTHETIST, CERTIFIED REGISTERED

## 2022-11-22 PROCEDURE — 370N000003 HC ANESTHESIA WARD SERVICE

## 2022-11-22 PROCEDURE — 00HU33Z INSERTION OF INFUSION DEVICE INTO SPINAL CANAL, PERCUTANEOUS APPROACH: ICD-10-PCS | Performed by: NURSE ANESTHETIST, CERTIFIED REGISTERED

## 2022-11-22 PROCEDURE — 0KQM0ZZ REPAIR PERINEUM MUSCLE, OPEN APPROACH: ICD-10-PCS | Performed by: OBSTETRICS & GYNECOLOGY

## 2022-11-22 PROCEDURE — 250N000009 HC RX 250: Performed by: OBSTETRICS & GYNECOLOGY

## 2022-11-22 PROCEDURE — 120N000001 HC R&B MED SURG/OB

## 2022-11-22 PROCEDURE — 722N000001 HC LABOR CARE VAGINAL DELIVERY SINGLE

## 2022-11-22 RX ORDER — SODIUM CHLORIDE, SODIUM LACTATE, POTASSIUM CHLORIDE, CALCIUM CHLORIDE 600; 310; 30; 20 MG/100ML; MG/100ML; MG/100ML; MG/100ML
INJECTION, SOLUTION INTRAVENOUS CONTINUOUS PRN
Status: DISCONTINUED | OUTPATIENT
Start: 2022-11-22 | End: 2022-11-22 | Stop reason: HOSPADM

## 2022-11-22 RX ORDER — MISOPROSTOL 200 UG/1
800 TABLET ORAL
Status: DISCONTINUED | OUTPATIENT
Start: 2022-11-22 | End: 2022-11-24 | Stop reason: HOSPADM

## 2022-11-22 RX ORDER — LIDOCAINE HYDROCHLORIDE AND EPINEPHRINE 15; 5 MG/ML; UG/ML
INJECTION, SOLUTION EPIDURAL PRN
Status: DISCONTINUED | OUTPATIENT
Start: 2022-11-22 | End: 2022-11-22

## 2022-11-22 RX ORDER — ONDANSETRON 4 MG/1
4 TABLET, ORALLY DISINTEGRATING ORAL EVERY 6 HOURS PRN
Status: DISCONTINUED | OUTPATIENT
Start: 2022-11-22 | End: 2022-11-22

## 2022-11-22 RX ORDER — ACETAMINOPHEN 325 MG/1
650 TABLET ORAL EVERY 4 HOURS PRN
Status: DISCONTINUED | OUTPATIENT
Start: 2022-11-22 | End: 2022-11-24 | Stop reason: HOSPADM

## 2022-11-22 RX ORDER — DOCUSATE SODIUM 100 MG/1
100 CAPSULE, LIQUID FILLED ORAL DAILY
Status: DISCONTINUED | OUTPATIENT
Start: 2022-11-23 | End: 2022-11-24 | Stop reason: HOSPADM

## 2022-11-22 RX ORDER — CARBOPROST TROMETHAMINE 250 UG/ML
250 INJECTION, SOLUTION INTRAMUSCULAR
Status: DISCONTINUED | OUTPATIENT
Start: 2022-11-22 | End: 2022-11-24 | Stop reason: HOSPADM

## 2022-11-22 RX ORDER — LIDOCAINE 40 MG/G
CREAM TOPICAL
Status: DISCONTINUED | OUTPATIENT
Start: 2022-11-22 | End: 2022-11-22 | Stop reason: HOSPADM

## 2022-11-22 RX ORDER — EPHEDRINE SULFATE 50 MG/ML
5 INJECTION, SOLUTION INTRAMUSCULAR; INTRAVENOUS; SUBCUTANEOUS
Status: DISCONTINUED | OUTPATIENT
Start: 2022-11-22 | End: 2022-11-22 | Stop reason: HOSPADM

## 2022-11-22 RX ORDER — OXYTOCIN/0.9 % SODIUM CHLORIDE 30/500 ML
340 PLASTIC BAG, INJECTION (ML) INTRAVENOUS CONTINUOUS PRN
Status: DISCONTINUED | OUTPATIENT
Start: 2022-11-22 | End: 2022-11-24 | Stop reason: HOSPADM

## 2022-11-22 RX ORDER — TRANEXAMIC ACID 10 MG/ML
1 INJECTION, SOLUTION INTRAVENOUS EVERY 30 MIN PRN
Status: DISCONTINUED | OUTPATIENT
Start: 2022-11-22 | End: 2022-11-24 | Stop reason: HOSPADM

## 2022-11-22 RX ORDER — SCOLOPAMINE TRANSDERMAL SYSTEM 1 MG/1
1 PATCH, EXTENDED RELEASE TRANSDERMAL ONCE
Status: DISCONTINUED | OUTPATIENT
Start: 2022-11-22 | End: 2022-11-22 | Stop reason: HOSPADM

## 2022-11-22 RX ORDER — METHYLERGONOVINE MALEATE 0.2 MG/ML
200 INJECTION INTRAVENOUS
Status: DISCONTINUED | OUTPATIENT
Start: 2022-11-22 | End: 2022-11-24 | Stop reason: HOSPADM

## 2022-11-22 RX ORDER — MODIFIED LANOLIN
OINTMENT (GRAM) TOPICAL
Status: DISCONTINUED | OUTPATIENT
Start: 2022-11-22 | End: 2022-11-24 | Stop reason: HOSPADM

## 2022-11-22 RX ORDER — NALBUPHINE HYDROCHLORIDE 10 MG/ML
2.5-5 INJECTION, SOLUTION INTRAMUSCULAR; INTRAVENOUS; SUBCUTANEOUS EVERY 6 HOURS PRN
Status: DISCONTINUED | OUTPATIENT
Start: 2022-11-22 | End: 2022-11-24 | Stop reason: HOSPADM

## 2022-11-22 RX ORDER — BISACODYL 10 MG
10 SUPPOSITORY, RECTAL RECTAL DAILY PRN
Status: DISCONTINUED | OUTPATIENT
Start: 2022-11-22 | End: 2022-11-24 | Stop reason: HOSPADM

## 2022-11-22 RX ORDER — IBUPROFEN 800 MG/1
800 TABLET, FILM COATED ORAL EVERY 6 HOURS PRN
Status: DISCONTINUED | OUTPATIENT
Start: 2022-11-22 | End: 2022-11-24 | Stop reason: HOSPADM

## 2022-11-22 RX ORDER — HYDROCORTISONE 25 MG/G
CREAM TOPICAL 3 TIMES DAILY PRN
Status: DISCONTINUED | OUTPATIENT
Start: 2022-11-22 | End: 2022-11-24 | Stop reason: HOSPADM

## 2022-11-22 RX ORDER — ONDANSETRON 2 MG/ML
4 INJECTION INTRAMUSCULAR; INTRAVENOUS EVERY 6 HOURS PRN
Status: DISCONTINUED | OUTPATIENT
Start: 2022-11-22 | End: 2022-11-22

## 2022-11-22 RX ORDER — MISOPROSTOL 200 UG/1
400 TABLET ORAL
Status: DISCONTINUED | OUTPATIENT
Start: 2022-11-22 | End: 2022-11-24 | Stop reason: HOSPADM

## 2022-11-22 RX ORDER — OXYTOCIN/0.9 % SODIUM CHLORIDE 30/500 ML
1-24 PLASTIC BAG, INJECTION (ML) INTRAVENOUS CONTINUOUS
Status: DISCONTINUED | OUTPATIENT
Start: 2022-11-22 | End: 2022-11-22 | Stop reason: HOSPADM

## 2022-11-22 RX ORDER — OXYTOCIN 10 [USP'U]/ML
10 INJECTION, SOLUTION INTRAMUSCULAR; INTRAVENOUS
Status: DISCONTINUED | OUTPATIENT
Start: 2022-11-22 | End: 2022-11-24 | Stop reason: HOSPADM

## 2022-11-22 RX ADMIN — Medication 25 MCG: at 05:35

## 2022-11-22 RX ADMIN — Medication 25 MCG: at 07:34

## 2022-11-22 RX ADMIN — LIDOCAINE HYDROCHLORIDE AND EPINEPHRINE 2 ML: 15; 5 INJECTION, SOLUTION EPIDURAL at 19:35

## 2022-11-22 RX ADMIN — LIDOCAINE HYDROCHLORIDE 10 ML: 10 INJECTION, SOLUTION EPIDURAL; INFILTRATION; INTRACAUDAL; PERINEURAL at 19:18

## 2022-11-22 RX ADMIN — Medication 10 ML/HR: at 19:39

## 2022-11-22 RX ADMIN — Medication 25 MCG: at 03:30

## 2022-11-22 RX ADMIN — ACETAMINOPHEN 650 MG: 325 TABLET, FILM COATED ORAL at 01:39

## 2022-11-22 RX ADMIN — ACETAMINOPHEN 650 MG: 325 TABLET, FILM COATED ORAL at 14:06

## 2022-11-22 RX ADMIN — SODIUM CHLORIDE, POTASSIUM CHLORIDE, SODIUM LACTATE AND CALCIUM CHLORIDE 500 ML: 600; 310; 30; 20 INJECTION, SOLUTION INTRAVENOUS at 18:26

## 2022-11-22 RX ADMIN — Medication 340 ML/HR: at 23:11

## 2022-11-22 RX ADMIN — PENICILLIN G POTASSIUM 5 MILLION UNITS: 5000000 POWDER, FOR SOLUTION INTRAMUSCULAR; INTRAPLEURAL; INTRATHECAL; INTRAVENOUS at 18:37

## 2022-11-22 RX ADMIN — Medication 8 ML: at 19:37

## 2022-11-22 RX ADMIN — PENICILLIN G 3 MILLION UNITS: 3000000 INJECTION, SOLUTION INTRAVENOUS at 22:39

## 2022-11-22 RX ADMIN — DINOPROSTONE 10 MG: 10 INSERT VAGINAL at 10:38

## 2022-11-22 RX ADMIN — Medication 25 MCG: at 01:24

## 2022-11-22 RX ADMIN — LIDOCAINE HYDROCHLORIDE AND EPINEPHRINE 3 ML: 15; 5 INJECTION, SOLUTION EPIDURAL at 19:34

## 2022-11-22 ASSESSMENT — ACTIVITIES OF DAILY LIVING (ADL)
ADLS_ACUITY_SCORE: 20

## 2022-11-22 NOTE — PROGRESS NOTES
Pt was up with constant back discomfort throughout the night, not coming and going with ctx. Pt given vistaril but decided not to take the morphine, she requested tylenol instead. Rested more comfortably after the tylenol. VSS.

## 2022-11-22 NOTE — PROGRESS NOTES
"  2022         S: Pt comfortable with epidural    O: Vital signs:  Temp: 98.6  F (37  C) Temp src: Oral BP: 123/75 Pulse: 78   Resp: 16   O2 Device: None (Room air)        Estimated body mass index is 39.81 kg/m  as calculated from the following:    Height as of 22: 1.613 m (5' 3.5\").    Weight as of 22: 103.6 kg (228 lb 4.8 oz).         A&O    SVE:2/50/-2    FHTs: 130s with moderate variability  Riddle: irregular    A/P 32 year old  at 40w4d  IOL.  IVF pregnancy  Antibiotics for unknown GBS, requested by patient for history of GBS urine in 2018  S/p Cytotec, discussed trial of low dose pitocin, king ball or cervidil.  Will plan cervidil overnight.  A POS  No results found for: RUBELLALLOYD Roblero MD ....................  2022    8:48 AM      "

## 2022-11-22 NOTE — PROGRESS NOTES
"Temp: 98.8  F (37.1  C) Temp src: Oral BP: 119/69 Pulse: 70   Resp: 16   O2 Device: None (Room air)       Patient up and showered, walked, went to cafeteria etc. Back in room now and ready for cervidil placement. Mary Jane placed for fetal monitoring. Questions encouraged and ongoing explanation provided. RN will monitor strip for 30 min then place cervidil per plan of care. Geraldine Wright RN on 11/22/2022 at 10:11 AM    Strip reviewed cat 1 FHR 130s with accels and no decels present, moderate variability. Ctx pattern irregular- pt remains aware of ctx. SVE completed 2cm mid station 50% -1. Rausch score 6. Cervidil placed with no issues at 1038. Pt is planning on napping for an hour. Mary Jane in place. Geraldine Wright RN on 11/22/2022 at 10:54 AM    Patient slept for awhile and is now awake and eating lunch. Reports feeling \"crampy\" ctx pattern regular and FHR tracing cat 1. Mary Jane in place. Geraldine Wright RN on 11/22/2022 at 1:39 PM    1755 patient up to restroom and reports feeling a \"pop\" like she got kicked \"in the bowels\" and then noticed some clear fluid leaking down the leg while walking to the bathroom.  Pt on toilet and pulled the call light. EFM continues to be cat 1. Pt back in bed to see if pooling will occur. Geraldine Wright RN on 11/22/2022 at 6:08 PM    Pooling occurred and MD in to see pt. Pt 4cm/80%/0 station. Cervidil was removed at 1818. 1830 IV PCN started. Anesthesia called for epidural placement. Bolus started. Geraldine Wright RN on 11/22/2022 at 6:53 PM    "

## 2022-11-22 NOTE — PLAN OF CARE
Goal Outcome Evaluation:         Pt ambulated to BR, when placed on monitor, tracing mothers heart rate, repositioned monitor, cat 1 tracing, with accels. Last dose of PO  Miso at 0730. Dr Noel following. Will discuss POC after SVE. IVF pregnancy. VSS. States decrease back pain after PO tylenol. Mattress pad is also on bed. IV saline flushed and patent.

## 2022-11-22 NOTE — CARE PLAN
"Writer assumed care of patient at 1930. FHM reactive, baseline WDL, accels present, no decelerations noted. Pt is comfortable and not feeling contractions at this time. No plan for pain control, would like to \"play it by ear\" per pt. Plans on breastfeeding and states all baby meds are ok. VSS. Plan to continue cervical ripening over night and start abx when in active labor.     /77 (BP Location: Left arm, Patient Position: Sitting)   Pulse 74   Temp 98.7  F (37.1  C) (Oral)   Resp 16   LMP 07/09/2021   Breastfeeding No     "

## 2022-11-23 LAB
HGB BLD-MCNC: 10.5 G/DL (ref 11.7–15.7)
HOLD SPECIMEN: NORMAL

## 2022-11-23 PROCEDURE — 250N000013 HC RX MED GY IP 250 OP 250 PS 637: Performed by: OBSTETRICS & GYNECOLOGY

## 2022-11-23 PROCEDURE — 85018 HEMOGLOBIN: CPT | Performed by: OBSTETRICS & GYNECOLOGY

## 2022-11-23 PROCEDURE — 120N000001 HC R&B MED SURG/OB

## 2022-11-23 PROCEDURE — 36415 COLL VENOUS BLD VENIPUNCTURE: CPT | Performed by: OBSTETRICS & GYNECOLOGY

## 2022-11-23 RX ORDER — LIDOCAINE HYDROCHLORIDE 10 MG/ML
INJECTION, SOLUTION EPIDURAL; INFILTRATION; INTRACAUDAL; PERINEURAL
Status: DISPENSED
Start: 2022-11-23 | End: 2022-11-23

## 2022-11-23 RX ADMIN — ACETAMINOPHEN 650 MG: 325 TABLET, FILM COATED ORAL at 04:31

## 2022-11-23 RX ADMIN — DOCUSATE SODIUM 100 MG: 100 CAPSULE, LIQUID FILLED ORAL at 08:17

## 2022-11-23 RX ADMIN — Medication: at 08:15

## 2022-11-23 RX ADMIN — IBUPROFEN 800 MG: 800 TABLET ORAL at 21:23

## 2022-11-23 RX ADMIN — IBUPROFEN 800 MG: 800 TABLET ORAL at 14:07

## 2022-11-23 RX ADMIN — ACETAMINOPHEN 650 MG: 325 TABLET, FILM COATED ORAL at 18:02

## 2022-11-23 RX ADMIN — ACETAMINOPHEN 650 MG: 325 TABLET, FILM COATED ORAL at 14:07

## 2022-11-23 RX ADMIN — IBUPROFEN 800 MG: 800 TABLET ORAL at 08:16

## 2022-11-23 RX ADMIN — IBUPROFEN 800 MG: 800 TABLET ORAL at 01:27

## 2022-11-23 ASSESSMENT — ACTIVITIES OF DAILY LIVING (ADL)
ADLS_ACUITY_SCORE: 20
ADLS_ACUITY_SCORE: 28

## 2022-11-23 NOTE — ANESTHESIA PREPROCEDURE EVALUATION
Anesthesia Pre-Procedure Evaluation    Patient: Tammy Zaragoza   MRN: [id number] : 1990        Procedure :           Past Medical History:   Diagnosis Date     Chickenpox      Osteoarthritis     right foot      Past Surgical History:   Procedure Laterality Date     wisdom teeth        Allergies   Allergen Reactions     Doxycycline Difficulty breathing and Other (See Comments)     Norco [Hydrocodone-Acetaminophen] Itching     And constipated     Tape [Adhesive Tape]      Chlorhexidine Rash     Rash on skin      Social History     Tobacco Use     Smoking status: Never     Smokeless tobacco: Never   Substance Use Topics     Alcohol use: Not Currently     Comment: occas-quit with pregnancy      Wt Readings from Last 1 Encounters:   22 103.6 kg (228 lb 4.8 oz)        Anesthesia Evaluation   Pt has had prior anesthetic.         ROS/MED HX  ENT/Pulmonary:  - neg pulmonary ROS     Neurologic:  - neg neurologic ROS     Cardiovascular:  - neg cardiovascular ROS     METS/Exercise Tolerance:     Hematologic:  - neg hematologic  ROS     Musculoskeletal:       GI/Hepatic:  - neg GI/hepatic ROS     Renal/Genitourinary:       Endo:  - neg endo ROS     Psychiatric/Substance Use:  - neg psychiatric ROS     Infectious Disease:       Malignancy:       Other:            Physical Exam    Airway        Mallampati: II   TM distance: > 3 FB   Neck ROM: full   Mouth opening: > 3 cm    Respiratory Devices and Support         Dental  no notable dental history         Cardiovascular   cardiovascular exam normal          Pulmonary   pulmonary exam normal                OUTSIDE LABS:  CBC:   Lab Results   Component Value Date    WBC 13.5 (H) 2022    WBC 13.2 (H) 2022    HGB 12.0 2022    HGB 11.6 (L) 2022    HCT 37.1 2022    HCT 36.2 2022     2022     2022     BMP:   Lab Results   Component Value Date     2018    POTASSIUM 3.8 2018    CHLORIDE 109  08/21/2018    CO2 27 08/21/2018    BUN 9 08/21/2018    CR 0.65 08/21/2018    GLC 81 08/21/2018     COAGS: No results found for: PTT, INR, FIBR  POC:   Lab Results   Component Value Date    HCGS Negative 08/21/2018     HEPATIC:   Lab Results   Component Value Date    ALBUMIN 3.8 08/21/2018    PROTTOTAL 7.5 08/21/2018    ALT 22 08/21/2018    AST 10 08/21/2018    ALKPHOS 70 08/21/2018    BILITOTAL 0.2 08/21/2018     OTHER:   Lab Results   Component Value Date    A1C 5.0 04/25/2022    MINDY 8.9 08/21/2018    LIPASE 135 08/21/2018    TSH 1.51 05/13/2021       Anesthesia Plan    ASA Status:  2      Anesthesia Type: Epidural.              Consents    Anesthesia Plan(s) and associated risks, benefits, and realistic alternatives discussed. Questions answered and patient/representative(s) expressed understanding.    - Discussed:     - Discussed with:  Patient         Postoperative Care            Comments:           neg OB ROS.       Kemar Fagan CRNA, APRN CRNA

## 2022-11-23 NOTE — L&D DELIVERY NOTE
S:Delivery  B:Ripened Labor,40w5d    No results found for: GBS with antibiotic treatment greater than or equal to 4 hours prior to delivery.  A: Patient delivered   lac 2nd degree at 2309 with Dr. Noel in attendance and baby placed on mother's abdomen for delayed cord clamping. Baby dried and stimulated. Baby placed  skin to skin @ 2309.. Apgars 8/9.Delivery .  IV infusion of Oxytocin  infused. Placenta removal spontaneous. MD does not want placenta sent to pathology.  See Flowsheet for VS and PP checks. Delivery QBL (mL): 400 mL.  Labor care plan goals met, transition now to postpartum care.  R: Expect routine postpartum care. Anticipate first feeding within the hour or whenever infant displays feeding cues. Continue skin to skin. Prior discussion with mother indicates that feeding plan is Breast feeding . Educated mother on importance of exclusive breastfeeding, expected feeding readiness cues and encouraged her to observe for these cues while rooming in. Informed her that breastfeeding assistance would be provided.

## 2022-11-23 NOTE — PROGRESS NOTES
"  2022         S: Increased contractions.  Leaking fluid    O: Vital signs:  Temp: 98  F (36.7  C) Temp src: Oral BP: 117/72 Pulse: 72   Resp: 16   O2 Device: None (Room air)        Estimated body mass index is 39.81 kg/m  as calculated from the following:    Height as of 22: 1.613 m (5' 3.5\").    Weight as of 22: 103.6 kg (228 lb 4.8 oz).         A&O    SVE:4/80/0    FHTs: 130s with moderate variability  [place]: q 1.5-5min    A/P 32 year old  at 40w4d  IOL, s/p misoprostil  Cervidil placed this am, pulled with SROM  SROM at 1755, clear  Requesting epidural  Monitor for continued cervical change  A+, RI  Continue antibiotics for unknown GBS    Linda Roblero MD ....................  2022    6:25 PM      "

## 2022-11-23 NOTE — L&D DELIVERY NOTE
OB Vaginal Delivery Note    Syed Zaragoza MRN# [id number]   Age: 32 year old YOB: 1990       GA: 40w4d  GP:   Labor Complications: None   EBL:   mL  Delivery QBL: 400 mL  Delivery Type: Vaginal, Spontaneous   ROM to Delivery Time: (Delivered) Hours: 5 Minutes: 14  [place] Weight:     1 Minute 5 Minute 10 Minute   Apgar Totals: 8   8        LACHELLEWEILER, AMANDA;ANITA FLORIAN;NEENA RIOS     Delivery summary:    Syed Zaragoza is a 32 year old  who presented for induction of labor.  She was admitted on 2022.  She received oral misoprostol for approximately 24 hours.  She was still unfavorable at this time, so cervidil was placed.  She had spontaneous rupture of membranes at 1755 on 22.  The cervidil was removed and she was found to be 4 cm dilated.  She had spontaneous contractions, so pitocin was not started.  She received an epidural.  She progressed to complete and pushing.  She had deep variables after contractions with pushing. The pediatric nurse practitioner was called to be present due to variables. Syed delivered a viable male infant with Apgars 8/8 over a 2nd degree perineal laceration.  EBL 400cc.      Procedure:    There was a spontaneous delivery of the fetal head.  The remaining portion of the fetus was delivered.  The fetus was placed on the maternal abdomen. The cord was clamped and cut after pulsation stopped.  The placenta was delivered spontaneously. The placenta was intact with a 3 vessel cord.  There was a 2nd degree perineal laceration that was repaired with 2.0 vicryl.  Mother and baby are stable in the labor and delivery room.      Delivery Details:  Syed Zaragoza, a 32 year old  female delivered a viable infant with apgars of 8  and 8 .     Cord complications: none   Placenta delivered at 2022 11:12 PM . Placental disposition was Hospital disposal . Fundal massage performed and fundus found to be firm.     Episiotomy: none     Perineum, vagina, cervix were inspected, and the following lacerations were noted:   Perineal lacerations: 2nd                  Excellent hemostasis was noted. Needle count correct. Infant and patient in delivery room in good and stable condition.        Lacho Zaragoza [[id number]]    Labor Event Times    Dilation complete date: 22 Complete time: 10:35 PM   Start pushing date/time: 2022 2238      Labor Events     labor?: No   steroids: None  Labor Type: Induction/Cervical ripening  Predominate monitoring during 1st stage: continuous electronic fetal monitoring     Antibiotics received during labor?: Yes  Reason for Antibiotics: GBS  Antibiotics received for GBS: Penicillin  Antibiotics Given (GBS): Greater than 4 hours prior to delivery     Rupture identifier: Sac 1  Rupture date/time: 22 1755   Rupture type: Spontaneous rupture of membranes occuring during spontaneous labor or augmentation  Fluid color: Clear  Fluid odor: Normal     Induction: Misoprostol, Cervidil  Induction date/time:     Cervical ripening date/time: 22 0914   Indications for induction: Post-term Gestation     1:1 continuous labor support provided by?: RN       Delivery/Placenta Date and Time    Delivery Date: 22 Delivery Time: 11:09 PM   Placenta Date/Time: 2022 11:12 PM  Oxytocin given at the time of delivery: after delivery of baby  Delivering clinician: Linda Roblero MD   Other personnel present at delivery:  Provider Role   Bushweiler, Amanda, RN Delivery Nurse   Viry León RN Charge Nurse   Angelica Martinez APRN CNP Pediatrician         Vaginal Counts     Initial count performed by 2 team members:  Two Team Members   Viry Noel       Needles Suture Needles Sponges (RETIRED) Instruments   Initial counts 2  5    Added to count  1 10    Relief counts       Final counts 2 1 15          Placed during labor Accounted for at the end of labor   FSE  Yes Yes   IUPC NA NA   Cervidil Yes Yes              Final count performed by 2 team members:  Two Team Members   Viry Noel      Final count correct?: Yes     Apgars    Living status: Living   1 Minute 5 Minute 10 Minute 15 Minute 20 Minute   Skin color: 0  0       Heart rate: 2  2       Reflex irritability: 2  2       Muscle tone: 2  2       Respiratory effort: 2  2       Total: 8  8       Apgars assigned by: SYED REYEZ     Cord    Vessels: 3 Vessels    Cord Complications: None               Cord Blood Disposition: Discard    Gases Sent?: No    Delayed cord clamping?: Yes    Cord Clamping Delay (seconds):  seconds    Stem cell collection?: No        Resuscitation    Methods: None     Skin to Skin and Feeding Plan    Skin to skin initiation date/time: 1841    Skin to skin with: Mother  Skin to skin end date/time:        Labor Events and Shoulder Dystocia    Fetal Tracing Prior to Delivery: Category 2     Delivery (Maternal) (Provider to Complete) ([id number])    Episiotomy: None  Perineal lacerations: 2nd    Repair suture: 2-0 Vicryl  Number of repair packets: 1  Genital tract inspection done: Pos     Blood Loss  Mother: Syed Zaragoza #[id number]   Start of Mother's Information    Delivery Blood Loss  22 1109 - 22 2338    Delivery QBL (mL) Hospital Encounter 400 mL    Total  400 mL         End of Mother's Information  Mother: Syed Zaragoza #[id number]          Delivery - Provider to Complete ([id number])    Delivering clinician: Linda Roblero MD  Attempted Delivery Types (Choose all that apply): Spontaneous Vaginal Delivery  Delivery Type (Choose the 1 that will go to the Birth History): Vaginal, Spontaneous                   Other personnel:  Provider Role   Bushweiler, Amanda, DEREJE Delivery Nurse   Viry León RN Charge Nurse   Angelica Martinez APRN CNP Pediatrician                Placenta    Date/Time: 2022 11:12 PM  Removal:  Spontaneous  Disposition: Hospital disposal           Anesthesia    Method: Epidural  Cervical dilation at placement: 4-7                Presentation and Position    Presentation: Vertex     Occiput Anterior                 Linda Roblero MD

## 2022-11-23 NOTE — PROGRESS NOTES
[facility] OB/GYN Postpartum Note    S: Pain well controlled with current pain meds. Lochia moderate.  Eating and drinking without nausea/vomiting.  Ambulating without difficulty.  Voiding without difficulty. Attempting to breast feed and pump.    O:  Patient Vitals for the past 24 hrs:   BP Temp Temp src Pulse Resp SpO2   11/23/22 0415 115/62 97.8  F (36.6  C) Oral 75 16 97 %   11/23/22 0115 118/65 99  F (37.2  C) Oral 66 16 --   11/23/22 0100 119/67 -- -- 65 -- --   11/23/22 0045 116/64 -- -- 70 -- --   11/23/22 0030 123/63 -- -- 74 -- --   11/23/22 0015 119/58 -- -- -- -- --   11/23/22 0000 113/56 -- -- -- -- --   11/22/22 2345 118/59 99.1  F (37.3  C) Oral -- 16 --   11/22/22 2315 120/59 -- -- -- -- 92 %   11/22/22 2130 117/65 -- -- -- -- 100 %   11/22/22 2100 111/62 -- -- -- -- 100 %   11/22/22 2025 113/65 -- -- -- -- 99 %   11/22/22 2020 115/63 -- -- -- -- 100 %   11/22/22 2016 112/57 -- -- -- -- --   11/22/22 2011 115/60 -- -- -- -- --   11/22/22 2007 -- -- -- -- -- 100 %   11/22/22 2006 115/60 -- -- -- -- --   11/22/22 2002 118/66 -- -- -- -- 100 %   11/22/22 1956 123/66 -- -- -- -- --   11/22/22 1954 123/65 -- -- -- -- --   11/22/22 1952 119/66 -- -- -- -- 99 %   11/22/22 1950 115/64 -- -- -- -- --   11/22/22 1948 117/65 -- -- -- -- --   11/22/22 1947 117/65 -- -- -- -- 99 %   11/22/22 1944 130/66 -- -- -- -- --   11/22/22 1942 130/73 -- -- -- -- 98 %   11/22/22 1940 136/79 -- -- -- -- --   11/22/22 1938 137/79 -- -- -- -- --   11/22/22 1830 128/77 98.6  F (37  C) Oral 70 18 --   11/22/22 1530 117/72 98  F (36.7  C) Oral 72 16 --   11/22/22 1000 119/69 98.8  F (37.1  C) Oral 70 16 --     Gen:  NAD  CV: Well perfused  Resp: Bilateral chest rise and fall  Abd: soft, nondistended, nontender, fundus firm at 2cm below umbilicus    Hemoglobin   Date Value Ref Range Status   11/23/2022 10.5 (L) 11.7 - 15.7 g/dL Final   11/21/2022 12.0 11.7 - 15.7 g/dL Final   08/21/2018 12.6 11.7 - 15.7 g/dL Final      A/P: 32 year old  PPD#1 s/p , doing well postpartum    Routine postpartum care  - Heme: Appropriate blood loss during delivery. Continue to monitor for s/s of anemia.   - Pain: Well controlled on PRN  ibuprofen 800mg q6hr and PRN tylenol 650mg q4hr  - GI: Continue docusate 100mg daily  - : Voiding spontaneously   - Baby:  Stable, breast feeding  - Contraception: Did not discuss  - Rh positivie, Rubella immune  - PPx: Encourage ambulation    Disposition: discharge to home on PPD#2    Davida Higuera MD  Office phone: [phone]  Pager: [phone]  Obstetrics and Gynecology  [facility]   2022 8:25 AM

## 2022-11-23 NOTE — ANESTHESIA PROCEDURE NOTES
"Epidural catheter Procedure Note    Pre-Procedure   Staff -        CRNA: Kemar Fagan APRN CRNA       Performed By: CRNA       Location: OB       Procedure Start/Stop Times: 11/22/2022 6:59 PM and 11/22/2022 7:41 PM       Pre-Anesthestic Checklist: patient identified, IV checked, risks and benefits discussed, informed consent, monitors and equipment checked, pre-op evaluation and at physician/surgeon's request  Timeout:       Correct Patient: Yes        Correct Procedure: Yes        Correct Site: Yes        Correct Position: Yes   Procedure Documentation  Procedure: epidural catheter       Patient Position: sitting       Patient Prep/Sterile Barriers: sterile gloves, mask, patient draped       Skin prep: Betadine       Local skin infiltrated with 10 mL of 1% lidocaine.        Insertion Site: L4-5. (midline approach).       Technique: LORT saline        CHERYL at 9 cm.       Needle Type: Big red truck driving schooly needle       Needle Gauge: 17.        Needle Length (Inches): 3.5        Catheter: 19 G.          Catheter threaded easily.           Threaded 15 cm at skin.         # of attempts: 2 and  # of redirects:  2    Assessment/Narrative         Paresthesias: No.       Test dose of 5 mL lidocaine 1.5% w/ 1:200,000 epinephrine at 19:34 CST.         Test dose negative, 3 minutes after injection, for signs of intravascular, subdural, or intrathecal injection.       Insertion/Infusion Method: LORT saline       Aspiration negative for Heme or CSF via Epidural Catheter.    Medication(s) Administered   Medication Administration Time: 11/22/2022 6:59 PM     Comments:  VAS pain score prior to epidural:10/10    VAS pain score after epidural:2/10    Pt. Tolerated well, FHR stable.      FOR [facility] ([facility] [place] [facility]) ONLY:   Pain Team Contact information: please page the Pain Team Via ReplySend. Search \"Pain\". During daytime hours, please page the attending first. At night please page the resident first.    "

## 2022-11-23 NOTE — PROGRESS NOTES
Pt vss, afebrile.  Small amt vaginal bleeding.  Taking Ibup & tylenol for discomfort.  Indep with self & infant cares.  Pt stable.

## 2022-11-23 NOTE — CARE PLAN
- writer assumed care of pt; CRNA bedside for epidural.   - epidural placed; CRNA remains bedside.   - pt states relief with epidural. Indwelling catheter placed, MD approved.   - pt experiencing labor shakes and states a lot of pressure with ctx. SVE /+1. MD notified.   - Pt turned to right side but lost signal on adrianna. Pt turned to left and broken tracing continued. Switched back to EFM and patient remains on left side.   - Pt continues to have pressure with ctx. Provider notified.   - Provider bedside, pt complete.   224- Prolonged decels noted. Fluid bolus going. Pt coached through pushing and deep breathing; Repositioned.  230- scalp electrode placed.  230-  of viable male infant

## 2022-11-24 VITALS
OXYGEN SATURATION: 99 % | SYSTOLIC BLOOD PRESSURE: 123 MMHG | TEMPERATURE: 98 F | RESPIRATION RATE: 16 BRPM | HEART RATE: 72 BPM | DIASTOLIC BLOOD PRESSURE: 79 MMHG

## 2022-11-24 PROCEDURE — 250N000013 HC RX MED GY IP 250 OP 250 PS 637: Performed by: OBSTETRICS & GYNECOLOGY

## 2022-11-24 RX ORDER — AMOXICILLIN 250 MG
1 CAPSULE ORAL DAILY
Qty: 100 TABLET | Refills: 0 | Status: SHIPPED | OUTPATIENT
Start: 2022-11-24 | End: 2023-01-03

## 2022-11-24 RX ORDER — ACETAMINOPHEN 325 MG/1
650 TABLET ORAL EVERY 6 HOURS PRN
Qty: 100 TABLET | Refills: 0 | Status: SHIPPED | OUTPATIENT
Start: 2022-11-24

## 2022-11-24 RX ORDER — IBUPROFEN 600 MG/1
600 TABLET, FILM COATED ORAL EVERY 6 HOURS PRN
Qty: 60 TABLET | Refills: 0 | Status: SHIPPED | OUTPATIENT
Start: 2022-11-24

## 2022-11-24 RX ADMIN — ACETAMINOPHEN 650 MG: 325 TABLET, FILM COATED ORAL at 04:04

## 2022-11-24 RX ADMIN — IBUPROFEN 800 MG: 800 TABLET ORAL at 04:04

## 2022-11-24 RX ADMIN — DOCUSATE SODIUM 100 MG: 100 CAPSULE, LIQUID FILLED ORAL at 08:15

## 2022-11-24 RX ADMIN — ACETAMINOPHEN 650 MG: 325 TABLET, FILM COATED ORAL at 08:14

## 2022-11-24 ASSESSMENT — ACTIVITIES OF DAILY LIVING (ADL)
ADLS_ACUITY_SCORE: 20
ADLS_ACUITY_SCORE: 28
ADLS_ACUITY_SCORE: 20
ADLS_ACUITY_SCORE: 28
ADLS_ACUITY_SCORE: 20
ADLS_ACUITY_SCORE: 20

## 2022-11-24 NOTE — PLAN OF CARE
Patient ambulating and voiding independently. Fundus is firm with minimal bleeding. Taking PRN Tylenol and Ibuprofen for pain control. Pumping independently. Vitals and assessments WDL. Pratt towards infant and independent with cares. Would like a bath demo in the morning. Would like discharge in the morning.

## 2022-11-24 NOTE — CARE PLAN
Pt up and independent in room. VSS. Fundal assessment WDL. Tylenol and Ibuprofen given as needed for back pain.   /73 (BP Location: Left arm, Patient Position: Supine)   Pulse 71   Temp 98.1  F (36.7  C) (Oral)   Resp 16   LMP 07/09/2021   SpO2 97%   Breastfeeding No

## 2022-11-24 NOTE — DISCHARGE SUMMARY
[facility] OB/GYN Discharge Summary    Tammy Zaragoza MRN# [id number]   Age: 32 year old YOB: 1990     Date of Admission:  2022  Date of Discharge:  2022  Admitting Physician:  Stephanie De La Torre MD  Discharge Physician:  Davida Higuera MD     Admit Dx:   - Intrauterine pregnancy at 40w3d   -IVF pregnancy  -Hx of GBS UTI previously    Discharge Dx:  - Same as above, s/p  at 40w6d  -Acute uncomplicated blood loss anemia from delivery    Procedures:  - Spontaneous vaginal delivery  - Epidural analgesia    Admit HPI/Labor Course:  Tammy Zaragoza is a 32 year old  who presented for induction of labor.  She was admitted on 2022.  She received oral misoprostol for approximately 24 hours.  She was still unfavorable at this time, so cervidil was placed.  She had spontaneous rupture of membranes at 1755 on 22.  The cervidil was removed and she was found to be 4 cm dilated.  She had spontaneous contractions, so pitocin was not started.  She received an epidural.  She progressed to complete and pushing.  She had deep variables after contractions with pushing. The pediatric nurse practitioner was called to be present due to variables. Tammy delivered a viable male infant with Apgars 8/8 over a 2nd degree perineal laceration.  EBL 400cc.  Please see her Admission H&P and Delivery Summary for further details.    Postpartum Course:  Her postpartum course was uncomplicated. On PPD#2, she was meeting all of her postpartum goals and deemed stable for discharge. She was voiding without difficulty, tolerating a regular diet without nausea and vomiting, her pain was well controlled on oral pain medicines and her lochia was appropriate. Her hemoglobin prior to delivery was 12.0 and after delivery was 10.5. Her Rh status was positive, and Rhogam was not indicated.     Discharge Medications:     Review of your medicines      START taking      Dose / Directions   acetaminophen  325 MG tablet  Commonly known as: TYLENOL  Used for: Vaginal delivery      Dose: 650 mg  Take 2 tablets (650 mg) by mouth every 6 hours as needed for mild pain Start after Delivery.  Quantity: 100 tablet  Refills: 0     ibuprofen 600 MG tablet  Commonly known as: ADVIL/MOTRIN  Used for: Vaginal delivery      Dose: 600 mg  Take 1 tablet (600 mg) by mouth every 6 hours as needed for moderate pain (4-6) Start after delivery  Quantity: 60 tablet  Refills: 0     senna-docusate 8.6-50 MG tablet  Commonly known as: SENOKOT-S/PERICOLACE  Used for: Vaginal delivery      Dose: 1 tablet  Take 1 tablet by mouth daily Start after delivery.  Quantity: 100 tablet  Refills: 0        CONTINUE these medicines which have NOT CHANGED      Dose / Directions   fish oil-omega-3 fatty acids 1000 MG capsule      Dose: 2 g  Take 2 g by mouth daily  Refills: 0     magnesium oxide 400 MG Caps      Refills: 0     prenatal multivitamin w/iron 27-0.8 MG tablet      Dose: 1 tablet  Take 1 tablet by mouth daily  Refills: 0        STOP taking    butalbital-acetaminophen-caffeine -40 MG tablet  Commonly known as: ESGIC        ondansetron 4 MG tablet  Commonly known as: ZOFRAN        vitamin D3 50 mcg (2000 units) tablet  Commonly known as: CHOLECALCIFEROL              Where to get your medicines      These medications were sent to [facility] [address]  [address] [facility] [address]    Phone: [phone]     acetaminophen 325 MG tablet    ibuprofen 600 MG tablet    senna-docusate 8.6-50 MG tablet       Discharge/Disposition:  Tammy Zaragoza was discharged to home in stable condition with the following instructions/medications:  1) Call for temperature > 100.4, bright red vaginal bleeding >1 pad an hour x 2 hours, foul smelling vaginal discharge, pain not controlled by usual oral pain meds, persistent nausea and vomiting not controlled on medications  2) She was undecided for contraception.  3) For  feeding she decided to breast feed and pump.  4) She was instructed to follow-up with her primary OB in 6 weeks for a routine postpartum visit.    Davida Higuera MD  Office phone: [phone]  Pager: [phone]  Obstetrics and Gynecology  [facility]   11/24/2022 7:36 AM

## 2022-11-24 NOTE — PLAN OF CARE
Patient discharged per ambulatory with infant in car seat. Mother verified that her band matches her infant's band by comparing the infant's  MR#.  Discharge instructions given. Encouraged to call for any problems, questions or concerns. RXs tylenol, ibuprofen and senna

## 2022-11-24 NOTE — PROGRESS NOTES
[facility] OB/GYN Postpartum Note    S: Eager to discharge. Denied concerns. Pain well controlled with current pain meds. Lochia moderate.  Eating and drinking without nausea/vomiting.  Ambulating without difficulty.  Voiding without difficulty. Attempting to breast feed and pump.    O:  Patient Vitals for the past 24 hrs:   BP Temp Temp src Pulse Resp SpO2   22 0400 123/79 98  F (36.7  C) Oral 72 16 99 %   22 1600 115/73 98.1  F (36.7  C) Oral 71 16 --   22 0845 119/73 98.3  F (36.8  C) Oral 69 16 --     Gen:  NAD  CV: Well perfused  Resp: Bilateral chest rise and fall  Abd: soft, nondistended, nontender, fundus firm at 2cm below umbilicus    Hemoglobin   Date Value Ref Range Status   2022 10.5 (L) 11.7 - 15.7 g/dL Final   2022 12.0 11.7 - 15.7 g/dL Final   2018 12.6 11.7 - 15.7 g/dL Final     A/P: 32 year old  PPD#2 s/p , doing well postpartum    Routine postpartum care  - Heme: Appropriate blood loss during delivery. Continue to monitor for s/s of anemia.   - Pain: Well controlled on PRN  ibuprofen 800mg q6hr and PRN tylenol 650mg q4hr  - GI: Continue docusate 100mg daily  - : Voiding spontaneously   - Baby:  Stable, breast feeding  - Rh positivie, Rubella immune  - PPx: Encourage ambulation    Disposition: discharge to home today    Davida Higuera MD  Office phone: [phone]  Pager: [phone]  Obstetrics and Gynecology  [facility]   2022 7:32 AM

## 2022-11-25 ENCOUNTER — PATIENT OUTREACH (OUTPATIENT)
Dept: FAMILY MEDICINE | Facility: CLINIC | Age: 32
End: 2022-11-25

## 2022-11-25 NOTE — TELEPHONE ENCOUNTER
Hospital follow up calls not routine practice after delivery.    Tasneem Cardona   Ob/Gyn Clinic  RN

## 2022-12-22 ENCOUNTER — MEDICAL CORRESPONDENCE (OUTPATIENT)
Dept: HEALTH INFORMATION MANAGEMENT | Facility: CLINIC | Age: 32
End: 2022-12-22

## 2023-01-03 ENCOUNTER — PRENATAL OFFICE VISIT (OUTPATIENT)
Dept: OBGYN | Facility: CLINIC | Age: 33
End: 2023-01-03
Payer: COMMERCIAL

## 2023-01-03 VITALS
HEIGHT: 64 IN | RESPIRATION RATE: 16 BRPM | DIASTOLIC BLOOD PRESSURE: 67 MMHG | SYSTOLIC BLOOD PRESSURE: 121 MMHG | HEART RATE: 88 BPM | WEIGHT: 207.8 LBS | TEMPERATURE: 97.1 F | BODY MASS INDEX: 35.48 KG/M2

## 2023-01-03 DIAGNOSIS — Z30.8 ENCOUNTER FOR OTHER CONTRACEPTIVE MANAGEMENT: ICD-10-CM

## 2023-01-03 PROCEDURE — 99207 PR POST PARTUM EXAM: CPT | Performed by: OBSTETRICS & GYNECOLOGY

## 2023-01-03 RX ORDER — SUMATRIPTAN 50 MG/1
TABLET, FILM COATED ORAL
COMMUNITY
Start: 2022-12-28

## 2023-01-03 RX ORDER — ETONOGESTREL AND ETHINYL ESTRADIOL VAGINAL RING .015; .12 MG/D; MG/D
1 RING VAGINAL
Qty: 3 EACH | Refills: 3 | Status: SHIPPED | OUTPATIENT
Start: 2023-01-03

## 2023-01-03 ASSESSMENT — ANXIETY QUESTIONNAIRES
1. FEELING NERVOUS, ANXIOUS, OR ON EDGE: SEVERAL DAYS
7. FEELING AFRAID AS IF SOMETHING AWFUL MIGHT HAPPEN: MORE THAN HALF THE DAYS
GAD7 TOTAL SCORE: 6
2. NOT BEING ABLE TO STOP OR CONTROL WORRYING: SEVERAL DAYS
3. WORRYING TOO MUCH ABOUT DIFFERENT THINGS: MORE THAN HALF THE DAYS
GAD7 TOTAL SCORE: 6
IF YOU CHECKED OFF ANY PROBLEMS ON THIS QUESTIONNAIRE, HOW DIFFICULT HAVE THESE PROBLEMS MADE IT FOR YOU TO DO YOUR WORK, TAKE CARE OF THINGS AT HOME, OR GET ALONG WITH OTHER PEOPLE: NOT DIFFICULT AT ALL
5. BEING SO RESTLESS THAT IT IS HARD TO SIT STILL: NOT AT ALL
6. BECOMING EASILY ANNOYED OR IRRITABLE: NOT AT ALL

## 2023-01-03 ASSESSMENT — PATIENT HEALTH QUESTIONNAIRE - PHQ9
5. POOR APPETITE OR OVEREATING: NOT AT ALL
SUM OF ALL RESPONSES TO PHQ QUESTIONS 1-9: 2

## 2023-01-03 NOTE — PROGRESS NOTES
"[facility]    Post Partum Office Visit    CC: Post partum visit    HPI: Tammy Zaragoza is a 32 year old  who presents for a 6 week post-partum visit.  Patient delivered on 22,  at term.  Patient delivered via , with 2nd degree laceration repair.  Complications During Labor: None     Information  Sex: male  Complications: none  Feeding Method: both breast and bottle - supplementing formula    Maternal Information  Postpartum Depression:  No  Resumed [place]:   No  Last Pap Smear: 21  Birth Control Method:ring    ROS:  General/Constitutional:  Denies chills or fever  Respiratory: Denies shortness of breath, cough, wheezing   Cardiovascular: Denies chest pain, exertional pain, irregular heartbeat  Gastrointestinal:  Denies abdominal pain, constipation, nausea, or vomiting  Genitourinary: Denies hematuria, difficulty urinating, frequency, or dysuria   Skin: Denies dry skin, itching, rash, new skin lesions  Musculoskeletal: Denies aching muscles or joints, swelling in joints, back pain, shoulder pain, or painful feet, no peripheral edema  Psychiatric: Denies post partum depression     Physical Exam:   Vitals:    23 1103   BP: 121/67   BP Location: Right arm   Patient Position: Chair   Cuff Size: Adult Regular   Pulse: 88   Resp: 16   Temp: 97.1  F (36.2  C)   TempSrc: Tympanic   Weight: 94.3 kg (207 lb 12.8 oz)   Height: 1.613 m (5' 3.5\")      Estimated body mass index is 36.23 kg/m  as calculated from the following:    Height as of this encounter: 1.613 m (5' 3.5\").    Weight as of this encounter: 94.3 kg (207 lb 12.8 oz).    General appearance: well-hydrated, A&O x 3, no apparent distress  Lungs: Equal expansion bilaterally, no accessory muscle use  Heart: No heaves or thrills. No peripheral varicosities  Neuro: CN II-XII grossly intact    Assessment and Plan:     Encounter Diagnoses   Name Primary?     Postpartum care and examination Yes     Encounter " for other contraceptive management        Appropriate post partum recovery.    Plan for Nuva Ring for contraception. Discussed affects on breat feeding.  She is OK if she needs to go to formula.    Plan for routine GYN cares.     PHQ-9 score:    PHQ 1/3/2023   PHQ-9 Total Score 2   Q9: Thoughts of better off dead/self-harm past 2 weeks Not at all

## 2023-01-03 NOTE — NURSING NOTE
"Initial /67 (BP Location: Right arm, Patient Position: Chair, Cuff Size: Adult Regular)   Pulse 88   Temp 97.1  F (36.2  C) (Tympanic)   Resp 16   Ht 1.613 m (5' 3.5\")   Wt 94.3 kg (207 lb 12.8 oz)   Breastfeeding Yes   BMI 36.23 kg/m   Estimated body mass index is 36.23 kg/m  as calculated from the following:    Height as of this encounter: 1.613 m (5' 3.5\").    Weight as of this encounter: 94.3 kg (207 lb 12.8 oz). .    Aye Damian, MARIE    "

## 2023-01-25 ENCOUNTER — MEDICAL CORRESPONDENCE (OUTPATIENT)
Dept: HEALTH INFORMATION MANAGEMENT | Facility: CLINIC | Age: 33
End: 2023-01-25
Payer: COMMERCIAL

## 2023-06-04 ENCOUNTER — HEALTH MAINTENANCE LETTER (OUTPATIENT)
Age: 33
End: 2023-06-04

## 2024-07-14 ENCOUNTER — HEALTH MAINTENANCE LETTER (OUTPATIENT)
Age: 34
End: 2024-07-14

## 2025-07-19 ENCOUNTER — HEALTH MAINTENANCE LETTER (OUTPATIENT)
Age: 35
End: 2025-07-19